# Patient Record
Sex: MALE | Race: BLACK OR AFRICAN AMERICAN | URBAN - METROPOLITAN AREA
[De-identification: names, ages, dates, MRNs, and addresses within clinical notes are randomized per-mention and may not be internally consistent; named-entity substitution may affect disease eponyms.]

---

## 2019-12-09 ENCOUNTER — OFFICE VISIT (OUTPATIENT)
Dept: FAMILY MEDICINE CLINIC | Age: 29
End: 2019-12-09

## 2019-12-09 ENCOUNTER — HOSPITAL ENCOUNTER (OUTPATIENT)
Dept: LAB | Age: 29
Discharge: HOME | End: 2019-12-09
Payer: MEDICAID

## 2019-12-09 VITALS
BODY MASS INDEX: 50.62 KG/M2 | TEMPERATURE: 98.3 F | HEART RATE: 82 BPM | RESPIRATION RATE: 22 BRPM | DIASTOLIC BLOOD PRESSURE: 64 MMHG | WEIGHT: 315 LBS | OXYGEN SATURATION: 97 % | HEIGHT: 66 IN | SYSTOLIC BLOOD PRESSURE: 125 MMHG

## 2019-12-09 DIAGNOSIS — Z00.00 ANNUAL PHYSICAL EXAM: ICD-10-CM

## 2019-12-09 DIAGNOSIS — H18.9 CORNEA DISORDER: ICD-10-CM

## 2019-12-09 DIAGNOSIS — H53.8 BLURRED VISION, BILATERAL: ICD-10-CM

## 2019-12-09 DIAGNOSIS — E66.01 OBESITY, MORBID (HCC): ICD-10-CM

## 2019-12-09 DIAGNOSIS — Z00.00 ANNUAL PHYSICAL EXAM: Primary | ICD-10-CM

## 2019-12-09 LAB
ALBUMIN SERPL-MCNC: 3.3 G/DL (ref 3.4–5)
ALBUMIN/GLOB SERPL: 0.8 {RATIO} (ref 0.8–1.7)
ALP SERPL-CCNC: 87 U/L (ref 45–117)
ALT SERPL-CCNC: 39 U/L (ref 16–61)
ANION GAP SERPL CALC-SCNC: 3 MMOL/L (ref 3–18)
AST SERPL-CCNC: 18 U/L (ref 10–38)
BASOPHILS # BLD: 0 K/UL (ref 0–0.1)
BASOPHILS NFR BLD: 0 % (ref 0–2)
BILIRUB SERPL-MCNC: 0.3 MG/DL (ref 0.2–1)
BUN SERPL-MCNC: 9 MG/DL (ref 7–18)
BUN/CREAT SERPL: 13 (ref 12–20)
CALCIUM SERPL-MCNC: 9.4 MG/DL (ref 8.5–10.1)
CHLORIDE SERPL-SCNC: 107 MMOL/L (ref 100–111)
CO2 SERPL-SCNC: 28 MMOL/L (ref 21–32)
CREAT SERPL-MCNC: 0.71 MG/DL (ref 0.6–1.3)
DIFFERENTIAL METHOD BLD: ABNORMAL
EOSINOPHIL # BLD: 0.1 K/UL (ref 0–0.4)
EOSINOPHIL NFR BLD: 1 % (ref 0–5)
ERYTHROCYTE [DISTWIDTH] IN BLOOD BY AUTOMATED COUNT: 17.4 % (ref 11.6–14.5)
GLOBULIN SER CALC-MCNC: 4.4 G/DL (ref 2–4)
GLUCOSE SERPL-MCNC: 90 MG/DL (ref 74–99)
HCT VFR BLD AUTO: 41.9 % (ref 36–48)
HGB BLD-MCNC: 12.8 G/DL (ref 13–16)
LYMPHOCYTES # BLD: 1.8 K/UL (ref 0.9–3.6)
LYMPHOCYTES NFR BLD: 20 % (ref 21–52)
MCH RBC QN AUTO: 24.7 PG (ref 24–34)
MCHC RBC AUTO-ENTMCNC: 30.5 G/DL (ref 31–37)
MCV RBC AUTO: 80.9 FL (ref 74–97)
MONOCYTES # BLD: 0.5 K/UL (ref 0.05–1.2)
MONOCYTES NFR BLD: 5 % (ref 3–10)
NEUTS SEG # BLD: 6.7 K/UL (ref 1.8–8)
NEUTS SEG NFR BLD: 74 % (ref 40–73)
PLATELET # BLD AUTO: 309 K/UL (ref 135–420)
PMV BLD AUTO: 11.3 FL (ref 9.2–11.8)
POTASSIUM SERPL-SCNC: 4.3 MMOL/L (ref 3.5–5.5)
PROT SERPL-MCNC: 7.7 G/DL (ref 6.4–8.2)
RBC # BLD AUTO: 5.18 M/UL (ref 4.7–5.5)
SODIUM SERPL-SCNC: 138 MMOL/L (ref 136–145)
WBC # BLD AUTO: 9.1 K/UL (ref 4.6–13.2)

## 2019-12-09 PROCEDURE — 36415 COLL VENOUS BLD VENIPUNCTURE: CPT

## 2019-12-09 PROCEDURE — 80053 COMPREHEN METABOLIC PANEL: CPT

## 2019-12-09 PROCEDURE — 80061 LIPID PANEL: CPT

## 2019-12-09 PROCEDURE — 85025 COMPLETE CBC W/AUTO DIFF WBC: CPT

## 2019-12-09 NOTE — PROGRESS NOTES
Bree Henao     Chief Complaint   Patient presents with   [facility].    Complete Physical     Vitals:    12/09/19 1253   BP: 125/64   Pulse: 82   Resp: 22   Temp: 98.3 °F (36.8 °C)   TempSrc: Oral   SpO2: 97%   Weight: (!) 416 lb (188.7 kg)   Height: 5' 6\" (1.676 m)   PainSc:   0 - No pain         HPI:Mr. Mary Gauthier is here to get annual physical examination patient stated that that  he had this appointment was told by his mother, he is requesting referral he had a problem with his cornea on the left eye, now he is experiencing similar symptoms on the right eye and decreased vision, and has not seen an eye doctor in a while. Patient is morbidly obese with BMI of 67 he said when he graduated high school his weight was, 270, he used to play football when he sustained an injury to his right knee,, and after that he stopped playing and he started increasing weight, patient has not worked for about 10 years he stayed home with his grandmother,. He is going to gym now about twice a week. Spent 1 hour each time. And he stated he had his diet most probably not the best!! History reviewed. No pertinent past medical history. Past Surgical History:   Procedure Laterality Date    HX APPENDECTOMY       Social History     Tobacco Use    Smoking status: Never Smoker    Smokeless tobacco: Never Used   Substance Use Topics    Alcohol use: Yes     Comment: very seldom, rare occasion       Family History   Problem Relation Age of Onset    No Known Problems Mother     No Known Problems Father        Review of Systems   Constitutional: Negative for chills, fever, malaise/fatigue and weight loss. HENT: Negative for congestion, ear discharge, ear pain, hearing loss, nosebleeds, sinus pain and sore throat. Eyes: Negative for blurred vision, double vision and discharge. Respiratory: Positive for shortness of breath. Negative for cough, hemoptysis, sputum production and wheezing.          With moderate exertion    Cardiovascular: Negative for chest pain, palpitations, claudication and leg swelling. Gastrointestinal: Negative for abdominal pain, blood in stool, constipation, diarrhea, melena, nausea and vomiting. Genitourinary: Negative for dysuria, flank pain, frequency, hematuria and urgency. Musculoskeletal: Positive for back pain and joint pain. Negative for falls, myalgias and neck pain. Skin: Negative for itching and rash. Neurological: Negative for dizziness, tingling, sensory change, speech change, focal weakness, seizures, loss of consciousness, weakness and headaches. Psychiatric/Behavioral: Negative for depression, hallucinations, memory loss, substance abuse and suicidal ideas. The patient is not nervous/anxious and does not have insomnia. Physical Exam  Vitals signs and nursing note reviewed. Constitutional:       General: He is not in acute distress. Appearance: He is well-developed. He is not diaphoretic. HENT:      Head: Normocephalic and atraumatic. Mouth/Throat:      Pharynx: No oropharyngeal exudate. Eyes:      General: No scleral icterus. Right eye: No discharge. Left eye: No discharge. Conjunctiva/sclera: Conjunctivae normal.      Pupils: Pupils are equal, round, and reactive to light. Comments: There is thickening ? ?scar tissues in the left Cornea     Neck:      Musculoskeletal: Normal range of motion and neck supple. Thyroid: No thyromegaly. Cardiovascular:      Rate and Rhythm: Normal rate and regular rhythm. Heart sounds: Normal heart sounds. Pulmonary:      Effort: Pulmonary effort is normal. No respiratory distress. Breath sounds: Normal breath sounds. No rales. Abdominal:      General: Bowel sounds are normal. There is no distension. Palpations: Abdomen is soft. There is no mass. Tenderness: There is no tenderness. There is no rebound. Musculoskeletal: Normal range of motion. General: No tenderness or deformity. Lymphadenopathy:      Cervical: No cervical adenopathy. Skin:     General: Skin is warm and dry. Findings: No erythema or rash. Neurological:      Mental Status: He is alert and oriented to person, place, and time. Cranial Nerves: No cranial nerve deficit. Coordination: Coordination normal.   Psychiatric:         Thought Content: Thought content normal.         Judgment: Judgment normal.          Assessment and plan     Plan of care has been discussed with the patient, he agrees to the plan and verbalized understanding. All his questions were answered  More than 50% of the time spent in this visit was counseling the patient about  illness and treatment options         1. Obesity, morbid ([facility]      I have discussed with patient his options as far as weight loss either     dietitian referral     weight loss management    Or  bariatric surgery    Patient choose to be referred to a dietitian        - REFERRAL TO PHYSICAL THERAPY    2. Annual physical exam    - CBC WITH AUTOMATED DIFF; Future  - METABOLIC PANEL, COMPREHENSIVE; Future  - LIPID PANEL; Future    3. Blurred vision, bilateral      - REFERRAL TO OPHTHALMOLOGY    4. Cornea disorder    - REFERRAL TO OPHTHALMOLOGY        Patient Active Problem List    Diagnosis Date Noted    Obesity, morbid ([facility] 12/09/2019     No results found for this or any previous visit. No results found for any previous visit. Follow-up and Dispositions    · Return for as per results.

## 2019-12-09 NOTE — PROGRESS NOTES
Nehemias Stone presents today for   Chief Complaint   Patient presents with   [facility] Establish Care    Complete Physical       Is someone accompanying this pt? no    Is the patient using any DME equipment during OV? no    Depression Screening:  3 most recent PHQ Screens 12/9/2019   Little interest or pleasure in doing things Not at all   Feeling down, depressed, irritable, or hopeless Not at all   Total Score PHQ 2 0       Learning Assessment:  Learning Assessment 12/9/2019   PRIMARY LEARNER Patient   PRIMARY LANGUAGE ENGLISH   LEARNER PREFERENCE PRIMARY DEMONSTRATION     READING     VIDEOS     LISTENING     PICTURES   ANSWERED BY patient   RELATIONSHIP SELF       Health Maintenance reviewed and discussed and ordered per Provider. Health Maintenance Due   Topic Date Due    DTaP/Tdap/Td series (1 - Tdap) 03/16/2002    PAP AKA CERVICAL CYTOLOGY  03/16/2012    Influenza Age 9 to Adult  08/01/2019   . Patient declines all vaccines. Patient is a male. Will have this switched to correctly reflect. Pt currently taking Antiplatelet therapy? no    Coordination of Care:  1. Have you been to the ER, urgent care clinic since your last visit? Hospitalized since your last visit? no    2. Have you seen or consulted any other health care providers outside of the [address] [place] [address] since your last visit? Include any pap smears or colon screening.  no

## 2019-12-10 LAB
CHOLEST SERPL-MCNC: 142 MG/DL
HDLC SERPL-MCNC: 49 MG/DL (ref 40–60)
HDLC SERPL: 2.9 {RATIO} (ref 0–5)
LDLC SERPL CALC-MCNC: 76.6 MG/DL (ref 0–100)
LIPID PROFILE,FLP: NORMAL
TRIGL SERPL-MCNC: 82 MG/DL (ref ?–150)
VLDLC SERPL CALC-MCNC: 16.4 MG/DL

## 2019-12-13 ENCOUNTER — TELEPHONE (OUTPATIENT)
Dept: FAMILY MEDICINE CLINIC | Age: 29
End: 2019-12-13

## 2019-12-13 NOTE — TELEPHONE ENCOUNTER
----- Message from Alejandro Truong MD sent at 12/13/2019 12:49 PM EST -----  Results shows iron deficiency anemia    Need to follow-up for further evaluation    Otherwise no remarkable abnormality

## 2019-12-13 NOTE — PROGRESS NOTES
Results shows iron deficiency anemia    Need to follow-up for further evaluation    Otherwise no remarkable abnormality

## 2019-12-13 NOTE — TELEPHONE ENCOUNTER
Patient contacted with results per PCP, verified 2 patient identifiers. He has no further questions or concerns at this time. He states he will call back to schedule follow up.

## 2020-12-03 ENCOUNTER — OFFICE VISIT (OUTPATIENT)
Dept: FAMILY MEDICINE CLINIC | Age: 30
End: 2020-12-03

## 2020-12-03 VITALS
WEIGHT: 315 LBS | BODY MASS INDEX: 50.62 KG/M2 | TEMPERATURE: 97.1 F | RESPIRATION RATE: 18 BRPM | DIASTOLIC BLOOD PRESSURE: 72 MMHG | SYSTOLIC BLOOD PRESSURE: 113 MMHG | HEIGHT: 66 IN | HEART RATE: 89 BPM | OXYGEN SATURATION: 97 %

## 2020-12-03 NOTE — PROGRESS NOTES
Junior Choudhury is a 35 y.o. male (: 1990) presenting to address:    Chief Complaint   Patient presents with    Complete Physical       Vitals:    20 0917   BP: 113/72   Pulse: 89   Resp: 18   Temp: 97.1 °F (36.2 °C)   TempSrc: Temporal   SpO2: 97%   Weight: (!) 410 lb (186 kg)   Height: 5' 6\" (1.676 m)   PainSc:   0 - No pain       Hearing/Vision:   No exam data present    Learning Assessment:     Learning Assessment 2019   PRIMARY LEARNER Patient   PRIMARY LANGUAGE ENGLISH   LEARNER PREFERENCE PRIMARY DEMONSTRATION     READING     VIDEOS     LISTENING     PICTURES   ANSWERED BY patient   RELATIONSHIP SELF     Depression Screening:     3 most recent PHQ Screens 12/3/2020   Little interest or pleasure in doing things Not at all   Feeling down, depressed, irritable, or hopeless Not at all   Total Score PHQ 2 0     Fall Risk Assessment:     Fall Risk Assessment, last 12 mths 12/3/2020   Able to walk? Yes   Fall in past 12 months? No     Abuse Screening:     Abuse Screening Questionnaire 12/3/2020   Do you ever feel afraid of your partner? N   Are you in a relationship with someone who physically or mentally threatens you? N   Is it safe for you to go home? Y     Coordination of Care Questionaire:   1. Have you been to the ER, urgent care clinic since your last visit? Hospitalized since your last visit? NO    2. Have you seen or consulted any other health care providers outside of the [address] [place] [address] since your last visit? Include any pap smears or colon screening. NO    Advanced Directive:   1. Do you have an Advanced Directive? NO    2. Would you like information on Advanced Directives? NO    Patient DECLINED flu vaccine.

## 2021-02-18 ENCOUNTER — HOSPITAL ENCOUNTER (OUTPATIENT)
Dept: LAB | Age: 31
Discharge: HOME | End: 2021-02-18
Payer: MEDICAID

## 2021-02-18 ENCOUNTER — APPOINTMENT (OUTPATIENT)
Dept: FAMILY MEDICINE CLINIC | Age: 31
End: 2021-02-18

## 2021-02-18 ENCOUNTER — OFFICE VISIT (OUTPATIENT)
Dept: FAMILY MEDICINE CLINIC | Age: 31
End: 2021-02-18
Payer: COMMERCIAL

## 2021-02-18 ENCOUNTER — TELEPHONE (OUTPATIENT)
Dept: FAMILY MEDICINE CLINIC | Age: 31
End: 2021-02-18

## 2021-02-18 VITALS
WEIGHT: 315 LBS | DIASTOLIC BLOOD PRESSURE: 84 MMHG | TEMPERATURE: 97 F | OXYGEN SATURATION: 100 % | RESPIRATION RATE: 17 BRPM | SYSTOLIC BLOOD PRESSURE: 130 MMHG | HEART RATE: 92 BPM | HEIGHT: 66 IN | BODY MASS INDEX: 50.62 KG/M2

## 2021-02-18 DIAGNOSIS — D50.9 IRON DEFICIENCY ANEMIA, UNSPECIFIED IRON DEFICIENCY ANEMIA TYPE: ICD-10-CM

## 2021-02-18 DIAGNOSIS — B35.9 TINEA: ICD-10-CM

## 2021-02-18 DIAGNOSIS — H18.9 CORNEA DISORDER: ICD-10-CM

## 2021-02-18 DIAGNOSIS — Z00.00 ANNUAL PHYSICAL EXAM: ICD-10-CM

## 2021-02-18 DIAGNOSIS — Z00.00 ANNUAL PHYSICAL EXAM: Primary | ICD-10-CM

## 2021-02-18 DIAGNOSIS — E66.01 OBESITY, MORBID (HCC): ICD-10-CM

## 2021-02-18 DIAGNOSIS — H18.603 KERATOCONUS OF BOTH EYES: ICD-10-CM

## 2021-02-18 LAB
ALBUMIN SERPL-MCNC: 3.3 G/DL (ref 3.4–5)
ALBUMIN/GLOB SERPL: 0.8 {RATIO} (ref 0.8–1.7)
ALP SERPL-CCNC: 87 U/L (ref 45–117)
ALT SERPL-CCNC: 33 U/L (ref 16–61)
ANION GAP SERPL CALC-SCNC: 7 MMOL/L (ref 3–18)
AST SERPL-CCNC: 17 U/L (ref 10–38)
BASOPHILS # BLD: 0 K/UL (ref 0–0.1)
BASOPHILS NFR BLD: 0 % (ref 0–2)
BILIRUB SERPL-MCNC: 0.3 MG/DL (ref 0.2–1)
BUN SERPL-MCNC: 8 MG/DL (ref 7–18)
BUN/CREAT SERPL: 13 (ref 12–20)
CALCIUM SERPL-MCNC: 9 MG/DL (ref 8.5–10.1)
CHLORIDE SERPL-SCNC: 106 MMOL/L (ref 100–111)
CHOLEST SERPL-MCNC: 150 MG/DL
CO2 SERPL-SCNC: 26 MMOL/L (ref 21–32)
CREAT SERPL-MCNC: 0.6 MG/DL (ref 0.6–1.3)
DIFFERENTIAL METHOD BLD: ABNORMAL
EOSINOPHIL # BLD: 0.1 K/UL (ref 0–0.4)
EOSINOPHIL NFR BLD: 1 % (ref 0–5)
ERYTHROCYTE [DISTWIDTH] IN BLOOD BY AUTOMATED COUNT: 16.9 % (ref 11.6–14.5)
GLOBULIN SER CALC-MCNC: 4.3 G/DL (ref 2–4)
GLUCOSE SERPL-MCNC: 84 MG/DL (ref 74–99)
HBA1C MFR BLD: 5.5 % (ref 4.2–5.6)
HCT VFR BLD AUTO: 42 % (ref 36–48)
HDLC SERPL-MCNC: 52 MG/DL (ref 40–60)
HDLC SERPL: 2.9 {RATIO} (ref 0–5)
HGB BLD-MCNC: 13.5 G/DL (ref 13–16)
IRON SATN MFR SERPL: 13 % (ref 20–50)
IRON SERPL-MCNC: 37 UG/DL (ref 50–175)
LDLC SERPL CALC-MCNC: 82 MG/DL (ref 0–100)
LIPID PROFILE,FLP: NORMAL
LYMPHOCYTES # BLD: 1.8 K/UL (ref 0.9–3.6)
LYMPHOCYTES NFR BLD: 15 % (ref 21–52)
MCH RBC QN AUTO: 25.3 PG (ref 24–34)
MCHC RBC AUTO-ENTMCNC: 32.1 G/DL (ref 31–37)
MCV RBC AUTO: 78.8 FL (ref 74–97)
MONOCYTES # BLD: 0.7 K/UL (ref 0.05–1.2)
MONOCYTES NFR BLD: 6 % (ref 3–10)
NEUTS SEG # BLD: 9.8 K/UL (ref 1.8–8)
NEUTS SEG NFR BLD: 78 % (ref 40–73)
PLATELET # BLD AUTO: 350 K/UL (ref 135–420)
PMV BLD AUTO: 11 FL (ref 9.2–11.8)
POTASSIUM SERPL-SCNC: 4.3 MMOL/L (ref 3.5–5.5)
PROT SERPL-MCNC: 7.6 G/DL (ref 6.4–8.2)
RBC # BLD AUTO: 5.33 M/UL (ref 4.7–5.5)
SODIUM SERPL-SCNC: 139 MMOL/L (ref 136–145)
TIBC SERPL-MCNC: 289 UG/DL (ref 250–450)
TRIGL SERPL-MCNC: 80 MG/DL (ref ?–150)
TSH SERPL DL<=0.05 MIU/L-ACNC: 1.09 UIU/ML (ref 0.36–3.74)
VLDLC SERPL CALC-MCNC: 16 MG/DL
WBC # BLD AUTO: 12.4 K/UL (ref 4.6–13.2)

## 2021-02-18 PROCEDURE — 80061 LIPID PANEL: CPT

## 2021-02-18 PROCEDURE — 85025 COMPLETE CBC W/AUTO DIFF WBC: CPT

## 2021-02-18 PROCEDURE — 83540 ASSAY OF IRON: CPT

## 2021-02-18 PROCEDURE — 36415 COLL VENOUS BLD VENIPUNCTURE: CPT

## 2021-02-18 PROCEDURE — 83036 HEMOGLOBIN GLYCOSYLATED A1C: CPT

## 2021-02-18 PROCEDURE — 80053 COMPREHEN METABOLIC PANEL: CPT

## 2021-02-18 PROCEDURE — 99395 PREV VISIT EST AGE 18-39: CPT | Performed by: LEGAL MEDICINE

## 2021-02-18 PROCEDURE — 84443 ASSAY THYROID STIM HORMONE: CPT

## 2021-02-18 RX ORDER — KETOCONAZOLE 20 MG/G
CREAM TOPICAL
Qty: 15 G | Refills: 0 | Status: SHIPPED | OUTPATIENT
Start: 2021-02-18 | End: 2021-02-18 | Stop reason: SDUPTHER

## 2021-02-18 NOTE — TELEPHONE ENCOUNTER
Patient called stating that his prescription wasn't covered at [facility] and would like for it to be resent to  [facility] on [address]. Please advise.

## 2021-02-18 NOTE — PROGRESS NOTES
Ulises Thompson is a 35 y.o. male (: 1990) presenting to address:    Chief Complaint   Patient presents with    Complete Physical     declined flu shot       Vitals:    21 1010   BP: 130/84   Pulse: 92   Resp: 17   Temp: 97 °F (36.1 °C)   TempSrc: Temporal   SpO2: 100%   Weight: (!) 398 lb (180.5 kg)   Height: 5' 6\" (1.676 m)   PainSc:   0 - No pain       Hearing/Vision:   No exam data present    Learning Assessment:     Learning Assessment 2019   PRIMARY LEARNER Patient   PRIMARY LANGUAGE ENGLISH   LEARNER PREFERENCE PRIMARY DEMONSTRATION     READING     VIDEOS     LISTENING     PICTURES   ANSWERED BY patient   RELATIONSHIP SELF     Depression Screening:     3 most recent PHQ Screens 2021   Little interest or pleasure in doing things Not at all   Feeling down, depressed, irritable, or hopeless Not at all   Total Score PHQ 2 0     Fall Risk Assessment:     Fall Risk Assessment, last 12 mths 2021   Able to walk? Yes   Fall in past 12 months? 0   Do you feel unsteady? 0   Are you worried about falling 0     Abuse Screening:     Abuse Screening Questionnaire 2021   Do you ever feel afraid of your partner? N   Are you in a relationship with someone who physically or mentally threatens you? N   Is it safe for you to go home? Y     Coordination of Care Questionaire:   1. Have you been to the ER, urgent care clinic since your last visit? Hospitalized since your last visit? NO    2. Have you seen or consulted any other health care providers outside of the [address] [place] [address] since your last visit? Include any pap smears or colon screening. NO    Advanced Directive:   1. Do you have an Advanced Directive? NO    2. Would you like information on Advanced Directives? NO    Patient DECLINED flu vaccine.

## 2021-02-18 NOTE — PROGRESS NOTES
Salty Mcarthur     Chief Complaint   Patient presents with    Complete Physical     declined flu shot     Vitals:    02/18/21 1010   BP: 130/84   Pulse: 92   Resp: 17   Temp: 97 °F (36.1 °C)   TempSrc: Temporal   SpO2: 100%   Weight: (!) 398 lb (180.5 kg)   Height: 5' 6\" (1.676 m)   PainSc:   0 - No pain         HPI:He is here for annual physical     He is morbidly obese I had lost 12 pounds in the last 2months he has been eating less and trying to eat healthy and regular are more    He has keratoconus following  Up at [facility]     He is not working currently last job he had in 2011 , graduated high school but did not finish college    He is staying home with his grandmother he is helping around the house    Does not smoke , drinks alcohol only few time a year   No illegal drug drugs , he smokes CBD vapors           No past medical history on file. Past Surgical History:   Procedure Laterality Date    HX APPENDECTOMY       Social History     Tobacco Use    Smoking status: Never Smoker    Smokeless tobacco: Never Used   Substance Use Topics    Alcohol use: Yes     Comment: very seldom, rare occasion       Family History   Problem Relation Age of Onset    No Known Problems Mother     No Known Problems Father        Review of Systems   Constitutional: Negative for chills, fever, malaise/fatigue and weight loss. HENT: Negative for congestion, ear discharge, ear pain, hearing loss, nosebleeds, sinus pain and sore throat. Eyes: Negative for blurred vision, double vision and discharge. Respiratory: Negative for cough, hemoptysis, sputum production, shortness of breath and wheezing. Cardiovascular: Negative for chest pain, palpitations, claudication and leg swelling. Gastrointestinal: Negative for abdominal pain, constipation, diarrhea, nausea and vomiting. Genitourinary: Negative for dysuria, flank pain, frequency, hematuria and urgency.    Musculoskeletal: Negative for back pain, falls, joint pain, myalgias and neck pain. Skin: Negative for itching and rash. Neurological: Negative for dizziness, tingling, sensory change, speech change, focal weakness, seizures, loss of consciousness, weakness and headaches. Psychiatric/Behavioral: Negative for depression, hallucinations, memory loss, substance abuse and suicidal ideas. The patient is not nervous/anxious and does not have insomnia. Physical Exam  Vitals signs and nursing note reviewed. Constitutional:       General: He is not in acute distress. Appearance: He is well-developed. He is not diaphoretic. HENT:      Head: Normocephalic and atraumatic. Eyes:      General: No scleral icterus. Right eye: No discharge. Left eye: No discharge. Conjunctiva/sclera: Conjunctivae normal.      Pupils: Pupils are equal, round, and reactive to light. Neck:      Musculoskeletal: Normal range of motion and neck supple. Thyroid: No thyromegaly. Cardiovascular:      Rate and Rhythm: Normal rate and regular rhythm. Heart sounds: Normal heart sounds. Pulmonary:      Effort: Pulmonary effort is normal. No respiratory distress. Breath sounds: Normal breath sounds. No rales. Abdominal:      General: Bowel sounds are normal. There is no distension. Palpations: Abdomen is soft. There is no mass. Tenderness: There is no abdominal tenderness. There is no rebound. Musculoskeletal: Normal range of motion. General: No tenderness or deformity. Lymphadenopathy:      Cervical: No cervical adenopathy. Skin:     General: Skin is warm and dry. Findings: No erythema or rash. Comments: There is a patch on anterior neck hyperpigmented well-demarcated not itching 3 cm in diameter smooth surface   Neurological:      Mental Status: He is alert and oriented to person, place, and time. Cranial Nerves: No cranial nerve deficit.       Coordination: Coordination normal.   Psychiatric:         Thought Content: Thought content normal.         Judgment: Judgment normal.          Assessment and plan     Plan of care has been discussed with the patient, he agrees to the plan and verbalized understanding. All his questions were answered  More than 50% of the time spent in this visit was counseling the patient about  illness and treatment options         1. Annual physical exam    - METABOLIC PANEL, COMPREHENSIVE; Future  - LIPID PANEL; Future  - CBC WITH AUTOMATED DIFF; Future  - TSH 3RD GENERATION; Future    2. Iron deficiency anemia, unspecified iron deficiency anemia type  He is not on iron supplements eating iron rich diet     - IRON PROFILE; Future    3. Obesity, morbid ([facility]   have discussed with him in length regarding lifestyle modifications      - HEMOGLOBIN A1C W/O EAG; Future  - TSH 3RD GENERATION; Future  - REFERRAL TO PHYSICAL THERAPY    4. Cornea disorder      5. Keratoconus of both eyes  Follow-up was ophthalmologist  6. Tinea  His medication for 3 to 4weeks if there is no improvement patient will be referred to dermatologist  - ketoconazole (NIZORAL) 2 % topical cream; Use daily for 4 weeks  Dispense: 15 g; Refill: 0        Patient Active Problem List    Diagnosis Date Noted    Obesity, morbid ([facility] 12/09/2019     Results for orders placed or performed during the hospital encounter of 12/09/19   CBC WITH AUTOMATED DIFF   Result Value Ref Range    WBC 9.1 4.6 - 13.2 K/uL    RBC 5.18 4.70 - 5.50 M/uL    HGB 12.8 (L) 13.0 - 16.0 g/dL    HCT 41.9 36.0 - 48.0 %    MCV 80.9 74.0 - 97.0 FL    MCH 24.7 24.0 - 34.0 PG    MCHC 30.5 (L) 31.0 - 37.0 g/dL    RDW 17.4 (H) 11.6 - 14.5 %    PLATELET 608 472 - 616 K/uL    MPV 11.3 9.2 - 11.8 FL    NEUTROPHILS 74 (H) 40 - 73 %    LYMPHOCYTES 20 (L) 21 - 52 %    MONOCYTES 5 3 - 10 %    EOSINOPHILS 1 0 - 5 %    BASOPHILS 0 0 - 2 %    ABS. NEUTROPHILS 6.7 1.8 - 8.0 K/UL    ABS. LYMPHOCYTES 1.8 0.9 - 3.6 K/UL    ABS. MONOCYTES 0.5 0.05 - 1.2 K/UL    ABS.  EOSINOPHILS 0.1 0.0 - 0.4 K/UL    ABS. BASOPHILS 0.0 0.0 - 0.1 K/UL    DF AUTOMATED     METABOLIC PANEL, COMPREHENSIVE   Result Value Ref Range    Sodium 138 136 - 145 mmol/L    Potassium 4.3 3.5 - 5.5 mmol/L    Chloride 107 100 - 111 mmol/L    CO2 28 21 - 32 mmol/L    Anion gap 3 3.0 - 18 mmol/L    Glucose 90 74 - 99 mg/dL    BUN 9 7.0 - 18 MG/DL    Creatinine 0.71 0.6 - 1.3 MG/DL    BUN/Creatinine ratio 13 12 - 20      GFR est AA >60 >60 ml/min/1.73m2    GFR est non-AA >60 >60 ml/min/1.73m2    Calcium 9.4 8.5 - 10.1 MG/DL    Bilirubin, total 0.3 0.2 - 1.0 MG/DL    ALT (SGPT) 39 16 - 61 U/L    AST (SGOT) 18 10 - 38 U/L    Alk. phosphatase 87 45 - 117 U/L    Protein, total 7.7 6.4 - 8.2 g/dL    Albumin 3.3 (L) 3.4 - 5.0 g/dL    Globulin 4.4 (H) 2.0 - 4.0 g/dL    A-G Ratio 0.8 0.8 - 1.7     LIPID PANEL   Result Value Ref Range    LIPID PROFILE          Cholesterol, total 142 <200 MG/DL    Triglyceride 82 <150 MG/DL    HDL Cholesterol 49 40 - 60 MG/DL    LDL, calculated 76.6 0 - 100 MG/DL    VLDL, calculated 16.4 MG/DL    CHOL/HDL Ratio 2.9 0 - 5.0       No visits with results within 3 Month(s) from this visit. Latest known visit with results is:   Hospital Outpatient Visit on 12/09/2019   Component Date Value Ref Range Status    WBC 12/09/2019 9.1  4.6 - 13.2 K/uL Final    RBC 12/09/2019 5.18  4.70 - 5.50 M/uL Final    HGB 12/09/2019 12.8* 13.0 - 16.0 g/dL Final    HCT 12/09/2019 41.9  36.0 - 48.0 % Final    MCV 12/09/2019 80.9  74.0 - 97.0 FL Final    MCH 12/09/2019 24.7  24.0 - 34.0 PG Final    MCHC 12/09/2019 30.5* 31.0 - 37.0 g/dL Final    RDW 12/09/2019 17.4* 11.6 - 14.5 % Final    PLATELET 42/69/2034 626  135 - 420 K/uL Final    MPV 12/09/2019 11.3  9.2 - 11.8 FL Final    NEUTROPHILS 12/09/2019 74* 40 - 73 % Final    LYMPHOCYTES 12/09/2019 20* 21 - 52 % Final    MONOCYTES 12/09/2019 5  3 - 10 % Final    EOSINOPHILS 12/09/2019 1  0 - 5 % Final    BASOPHILS 12/09/2019 0  0 - 2 % Final    ABS.  NEUTROPHILS 12/09/2019 6.7  1.8 - 8.0 K/UL Final    ABS. LYMPHOCYTES 12/09/2019 1.8  0.9 - 3.6 K/UL Final    ABS. MONOCYTES 12/09/2019 0.5  0.05 - 1.2 K/UL Final    ABS. EOSINOPHILS 12/09/2019 0.1  0.0 - 0.4 K/UL Final    ABS. BASOPHILS 12/09/2019 0.0  0.0 - 0.1 K/UL Final    DF 12/09/2019 AUTOMATED    Final    Sodium 12/09/2019 138  136 - 145 mmol/L Final    Potassium 12/09/2019 4.3  3.5 - 5.5 mmol/L Final    Chloride 12/09/2019 107  100 - 111 mmol/L Final    CO2 12/09/2019 28  21 - 32 mmol/L Final    Anion gap 12/09/2019 3  3.0 - 18 mmol/L Final    Glucose 12/09/2019 90  74 - 99 mg/dL Final    BUN 12/09/2019 9  7.0 - 18 MG/DL Final    Creatinine 12/09/2019 0.71  0.6 - 1.3 MG/DL Final    BUN/Creatinine ratio 12/09/2019 13  12 - 20   Final    GFR est AA 12/09/2019 >60  >60 ml/min/1.73m2 Final    GFR est non-AA 12/09/2019 >60  >60 ml/min/1.73m2 Final    Comment: (NOTE)  Estimated GFR is calculated using the Modification of Diet in Renal   Disease (MDRD) Study equation, reported for both  Americans   (GFRAA) and non- Americans (GFRNA), and normalized to 1.73m2   body surface area. The physician must decide which value applies to   the patient. The MDRD study equation should only be used in   individuals age 25 or older. It has not been validated for the   following: pregnant women, patients with serious comorbid conditions,   or on certain medications, or persons with extremes of body size,   muscle mass, or nutritional status.  Calcium 12/09/2019 9.4  8.5 - 10.1 MG/DL Final    Bilirubin, total 12/09/2019 0.3  0.2 - 1.0 MG/DL Final    ALT (SGPT) 12/09/2019 39  16 - 61 U/L Final    AST (SGOT) 12/09/2019 18  10 - 38 U/L Final    Alk.  phosphatase 12/09/2019 87  45 - 117 U/L Final    Protein, total 12/09/2019 7.7  6.4 - 8.2 g/dL Final    Albumin 12/09/2019 3.3* 3.4 - 5.0 g/dL Final    Globulin 12/09/2019 4.4* 2.0 - 4.0 g/dL Final    A-G Ratio 12/09/2019 0.8  0.8 - 1.7   Final    LIPID PROFILE 12/09/2019        Final    Cholesterol, total 12/09/2019 142  <200 MG/DL Final    Triglyceride 12/09/2019 82  <150 MG/DL Final    Comment: The drugs N-acetylcysteine (NAC) and  Metamiszole have been found to cause falsely  low results in this chemical assay. Please  be sure to submit blood samples obtained  BEFORE administration of either of these  drugs to assure correct results.  HDL Cholesterol 12/09/2019 49  40 - 60 MG/DL Final    LDL, calculated 12/09/2019 76.6  0 - 100 MG/DL Final    VLDL, calculated 12/09/2019 16.4  MG/DL Final    CHOL/HDL Ratio 12/09/2019 2.9  0 - 5.0   Final          Follow-up and Dispositions    · Return in about 6 months (around 8/18/2021).

## 2021-02-19 RX ORDER — KETOCONAZOLE 20 MG/G
CREAM TOPICAL
Qty: 15 G | Refills: 1 | Status: SHIPPED | OUTPATIENT
Start: 2021-02-19 | End: 2022-07-20

## 2021-03-31 ENCOUNTER — HOSPITAL ENCOUNTER (OUTPATIENT)
Dept: NUTRITION | Age: 31
Discharge: HOME | End: 2021-03-31
Payer: MEDICAID

## 2021-03-31 PROCEDURE — 97802 MEDICAL NUTRITION INDIV IN: CPT

## 2021-03-31 NOTE — PROGRESS NOTES
..  [address] [place] [address]   Nutrition Assessment  Medical Nutrition Therapy   Outpatient Initial Evaluation         Patient Name: Leann Enriquez : 1990   Treatment Diagnosis: Morbid obesity   Referral Source: Dung Hinojosa MD Start of Care [facility]): 3/31/2021     Gender: male Age: 28 y.o. Ht: 66 in Wt: 398  lb  kg   BMI: 64.24 BMR   Male 2708 BMR Female      Past Medical History:  n/a     Pertinent Medications:   n/a     Biochemical Data:   Lab Results   Component Value Date/Time    Hemoglobin A1c 5.5 2021 10:48 AM     Lab Results   Component Value Date/Time    Sodium 139 2021 10:48 AM    Potassium 4.3 2021 10:48 AM    Chloride 106 2021 10:48 AM    CO2 26 2021 10:48 AM    Anion gap 7 2021 10:48 AM    Glucose 84 2021 10:48 AM    BUN 8 2021 10:48 AM    Creatinine 0.60 2021 10:48 AM    BUN/Creatinine ratio 13 2021 10:48 AM    GFR est AA >60 2021 10:48 AM    GFR est non-AA >60 2021 10:48 AM    Calcium 9.0 2021 10:48 AM    Bilirubin, total 0.3 2021 10:48 AM    Alk. phosphatase 87 2021 10:48 AM    Protein, total 7.6 2021 10:48 AM    Albumin 3.3 (L) 2021 10:48 AM    Globulin 4.3 (H) 2021 10:48 AM    A-G Ratio 0.8 2021 10:48 AM    ALT (SGPT) 33 2021 10:48 AM    AST (SGOT) 17 2021 10:48 AM     Lab Results   Component Value Date/Time    Cholesterol, total 150 2021 10:48 AM    HDL Cholesterol 52 2021 10:48 AM    LDL, calculated 82 2021 10:48 AM    VLDL, calculated 16 2021 10:48 AM    Triglyceride 80 2021 10:48 AM    CHOL/HDL Ratio 2.9 2021 10:48 AM     Lab Results   Component Value Date/Time    ALT (SGPT) 33 2021 10:48 AM    AST (SGOT) 17 2021 10:48 AM    Alk.  phosphatase 87 2021 10:48 AM    Bilirubin, total 0.3 2021 10:48 AM     Lab Results Component Value Date/Time    Creatinine 0.60 02/18/2021 10:48 AM     Lab Results   Component Value Date/Time    BUN 8 02/18/2021 10:48 AM     No results found for: MCACR, MCA1, MCA2, MCA3, MCAU, MCAU2, MCALPOCT     Assessment:   The patient is here today because his doctor told him he needs to lose weight. The patient is unemployed and lives with his grandmother. When asked what he does during the day, the patient said, \"I sleep. \" The patient has a sleep disorder. He says he stays up all night and sleeps during the day. He says lately he has been trying to get up before noon but he doesn't eat anything until the evening when his grandmother prepares him food. He says he eats whenever he is hungry. He does not exercise at all. Food & Nutrition: The patient did not provide RD with a complete diet history. He says he eats a lot of [place] food (The patient is  and these are the foods his grandmother will cook). 7-8 PM meal: plate of rice and samaniego beans, chicken, sometimes spinach or bag of salad   Beverages: drinks 6 Gallons of 2% milk a week, \"sometimes\" will drink Sprite, Gingerale, Mary, water (2-3 bottles in 24 hours)    Problems: sleep disorder, no schedule for sleeping and eating, eats when hungry, huge portions but did not tell me what he eats, drinks sugary drinks and 2% milk all day, no exercise        Estimate Needs   Calories: 2200 to lose 1# per week  Protein: 138 Carbs: 248 Fat: 73   Kcal/day  g/day  g/day  g/day        percent: 25  45  30               Nutrition Diagnosis Morbid obesity related to excessive energy intake and no exercise as evidenced by BMI of 64.24         Nutrition Intervention &  Education: Educated patient on weight loss diet with plate method guidelines. Get on a schedule with sleeping and eating. Encouraged the patient to eat at least 3 times per day, spacing meals no more than 4-5 hours apart.  Discussed that 1/2 the plate should include non-starchy vegetables, 1/4 plate should be lean protein, and 1/4 of plate should be carbohydrate. Provided the patient with list of macro-nutrient sources (CHO, pro, and fat) and appropriate amounts of CHO to be consumed at each meal and snack. Suggested the patient include lean protein source with all meals and snacks. Include more non-starchy vegetables and 2 fruit servings per day (eat a variety). Don't drink calories: avoid fruit juice, regular sodas, sweet tea, Gatorade, milk. Eliminate/decrease fast food consumption. We discussed the importance of timing of meals. Discussed importance of 150 minutes per week physical activity. Handouts Provided: [x]  Carbohydrates  [x]  Protein  [x]  Non-starchy Vegetbles  []  Food Label  [x]  Meal and Snack Ideas  []  Food Journals []  Diabetes  []  Cholesterol  []  Sodium  [x]  Gen Nutr Guidelines  []  SBGM Guidelines  [x]  Others: My Healthy Plate   Information Reviewed with: Patient   Readiness to Change Stage: [x]  Pre-contemplative    []  Contemplative  []  Preparation               []  Action                  []  Maintenance   Potential Barriers to Learning: []  Decline in memory    []  Language barrier   []  Other:  [x]  Emotional                  []  Limited mobility  [x]  Lack of motivation     [] Vision, hearing or cognitive impairment   Expected Compliance: Poor. The patient is not ready to make the necessary lifestyle changes to see improvement. He needs a weight loss program with counseling, support group, and constant follow up. May need to consider bariatric surgery. (Patient needs to lose about 1/2 his body weight to be healthy.) Patient needs to manage his sleep to start.      Nutritional Goal - To promote lifestyle changes to result in:    [x]  Weight loss  []  Improved diabetic control  []  Decreased cholesterol levels  []  Decreased blood pressure  []  Weight maintenance []  Preventing any interactions associated with food allergies  []  Adequate weight gain toward goal weight  []  Other:        Patient Goals:   1. Get on a sleep schedule (go to bed at the same time every night). Then, eat on a schedule. Eat something within an hour of waking and then every 4-5 hours after that. 2. Follow plate method. The patient should not need snacks if eating  balanced meals. Eliminate all junk food and fast food. 3. Eliminate all sugary drinks. (Do not drink a gallon of milk every day; switch to skim milk). Switch to diet sodas. Drink 64 oz water daily. 4. Start some exercise. Try walking after meals for 10-15 minutes per session, twice a day, every day. Dietitian Signature: Agustin Albert RD,[facility] Date: 3/31/2021   Follow-up: PRN. Suggested patient call [facility] for program options.  (Patient is not ready at this time.) Time: 1:59 PM

## 2022-03-18 PROBLEM — E66.01 OBESITY, MORBID (HCC): Status: ACTIVE | Noted: 2019-12-09

## 2022-07-20 ENCOUNTER — HOSPITAL ENCOUNTER (OUTPATIENT)
Dept: LAB | Age: 32
Discharge: HOME | End: 2022-07-20
Payer: MEDICAID

## 2022-07-20 ENCOUNTER — OFFICE VISIT (OUTPATIENT)
Dept: FAMILY MEDICINE CLINIC | Age: 32
End: 2022-07-20
Payer: MEDICAID

## 2022-07-20 ENCOUNTER — APPOINTMENT (OUTPATIENT)
Dept: FAMILY MEDICINE CLINIC | Age: 32
End: 2022-07-20

## 2022-07-20 VITALS
HEIGHT: 66 IN | RESPIRATION RATE: 18 BRPM | HEART RATE: 97 BPM | SYSTOLIC BLOOD PRESSURE: 124 MMHG | WEIGHT: 315 LBS | TEMPERATURE: 98 F | DIASTOLIC BLOOD PRESSURE: 88 MMHG | OXYGEN SATURATION: 96 % | BODY MASS INDEX: 50.62 KG/M2

## 2022-07-20 DIAGNOSIS — E66.01 OBESITY, MORBID (HCC): ICD-10-CM

## 2022-07-20 DIAGNOSIS — Z00.00 ANNUAL PHYSICAL EXAM: ICD-10-CM

## 2022-07-20 DIAGNOSIS — Z20.2 POSSIBLE EXPOSURE TO STD: ICD-10-CM

## 2022-07-20 DIAGNOSIS — Z13.1 SCREENING FOR DIABETES MELLITUS (DM): ICD-10-CM

## 2022-07-20 DIAGNOSIS — H18.603 KERATOCONUS OF BOTH EYES: ICD-10-CM

## 2022-07-20 DIAGNOSIS — D50.9 IRON DEFICIENCY ANEMIA, UNSPECIFIED IRON DEFICIENCY ANEMIA TYPE: ICD-10-CM

## 2022-07-20 DIAGNOSIS — Z00.00 ANNUAL PHYSICAL EXAM: Primary | ICD-10-CM

## 2022-07-20 LAB
ALBUMIN SERPL-MCNC: 3.6 G/DL (ref 3.4–5)
ALBUMIN/GLOB SERPL: 0.9 {RATIO} (ref 0.8–1.7)
ALP SERPL-CCNC: 83 U/L (ref 45–117)
ALT SERPL-CCNC: 43 U/L (ref 16–61)
ANION GAP SERPL CALC-SCNC: 6 MMOL/L (ref 3–18)
AST SERPL-CCNC: 26 U/L (ref 10–38)
BASOPHILS # BLD: 0 K/UL (ref 0–0.1)
BASOPHILS NFR BLD: 0 % (ref 0–2)
BILIRUB SERPL-MCNC: 0.4 MG/DL (ref 0.2–1)
BUN SERPL-MCNC: 4 MG/DL (ref 7–18)
BUN/CREAT SERPL: 5 (ref 12–20)
CALCIUM SERPL-MCNC: 8.5 MG/DL (ref 8.5–10.1)
CHLORIDE SERPL-SCNC: 108 MMOL/L (ref 100–111)
CHOLEST SERPL-MCNC: 141 MG/DL
CO2 SERPL-SCNC: 26 MMOL/L (ref 21–32)
CREAT SERPL-MCNC: 0.8 MG/DL (ref 0.6–1.3)
DIFFERENTIAL METHOD BLD: ABNORMAL
EOSINOPHIL # BLD: 0 K/UL (ref 0–0.4)
EOSINOPHIL NFR BLD: 0 % (ref 0–5)
ERYTHROCYTE [DISTWIDTH] IN BLOOD BY AUTOMATED COUNT: 16.4 % (ref 11.6–14.5)
FERRITIN SERPL-MCNC: 111 NG/ML (ref 8–388)
GLOBULIN SER CALC-MCNC: 4 G/DL (ref 2–4)
GLUCOSE SERPL-MCNC: 84 MG/DL (ref 74–99)
HBA1C MFR BLD: 5.4 % (ref 4.2–5.6)
HCT VFR BLD AUTO: 46 % (ref 36–48)
HDLC SERPL-MCNC: 51 MG/DL (ref 40–60)
HDLC SERPL: 2.8 {RATIO} (ref 0–5)
HGB BLD-MCNC: 14.5 G/DL (ref 13–16)
IMM GRANULOCYTES # BLD AUTO: 0 K/UL (ref 0–0.04)
IMM GRANULOCYTES NFR BLD AUTO: 0 % (ref 0–0.5)
IRON SATN MFR SERPL: 18 % (ref 20–50)
IRON SERPL-MCNC: 56 UG/DL (ref 50–175)
LDLC SERPL CALC-MCNC: 72.4 MG/DL (ref 0–100)
LIPID PROFILE,FLP: NORMAL
LYMPHOCYTES # BLD: 1.5 K/UL (ref 0.9–3.6)
LYMPHOCYTES NFR BLD: 26 % (ref 21–52)
MCH RBC QN AUTO: 25.8 PG (ref 24–34)
MCHC RBC AUTO-ENTMCNC: 31.5 G/DL (ref 31–37)
MCV RBC AUTO: 82 FL (ref 78–100)
MONOCYTES # BLD: 0.6 K/UL (ref 0.05–1.2)
MONOCYTES NFR BLD: 11 % (ref 3–10)
NEUTS SEG # BLD: 3.4 K/UL (ref 1.8–8)
NEUTS SEG NFR BLD: 62 % (ref 40–73)
NRBC # BLD: 0 K/UL (ref 0–0.01)
NRBC BLD-RTO: 0 PER 100 WBC
PLATELET # BLD AUTO: 262 K/UL (ref 135–420)
PMV BLD AUTO: 11.5 FL (ref 9.2–11.8)
POTASSIUM SERPL-SCNC: 3.6 MMOL/L (ref 3.5–5.5)
PROT SERPL-MCNC: 7.6 G/DL (ref 6.4–8.2)
RBC # BLD AUTO: 5.61 M/UL (ref 4.35–5.65)
SODIUM SERPL-SCNC: 140 MMOL/L (ref 136–145)
T4 FREE SERPL-MCNC: 1.3 NG/DL (ref 0.7–1.5)
TIBC SERPL-MCNC: 314 UG/DL (ref 250–450)
TRIGL SERPL-MCNC: 88 MG/DL (ref ?–150)
TSH SERPL DL<=0.05 MIU/L-ACNC: 0.81 UIU/ML (ref 0.36–3.74)
VLDLC SERPL CALC-MCNC: 17.6 MG/DL
WBC # BLD AUTO: 5.5 K/UL (ref 4.6–13.2)

## 2022-07-20 PROCEDURE — 85025 COMPLETE CBC W/AUTO DIFF WBC: CPT

## 2022-07-20 PROCEDURE — 87491 CHLMYD TRACH DNA AMP PROBE: CPT

## 2022-07-20 PROCEDURE — 80053 COMPREHEN METABOLIC PANEL: CPT

## 2022-07-20 PROCEDURE — 80061 LIPID PANEL: CPT

## 2022-07-20 PROCEDURE — 87389 HIV-1 AG W/HIV-1&-2 AB AG IA: CPT

## 2022-07-20 PROCEDURE — 84439 ASSAY OF FREE THYROXINE: CPT

## 2022-07-20 PROCEDURE — 36415 COLL VENOUS BLD VENIPUNCTURE: CPT

## 2022-07-20 PROCEDURE — 86780 TREPONEMA PALLIDUM: CPT

## 2022-07-20 PROCEDURE — 99395 PREV VISIT EST AGE 18-39: CPT | Performed by: LEGAL MEDICINE

## 2022-07-20 PROCEDURE — 83036 HEMOGLOBIN GLYCOSYLATED A1C: CPT

## 2022-07-20 PROCEDURE — 83540 ASSAY OF IRON: CPT

## 2022-07-20 PROCEDURE — 87340 HEPATITIS B SURFACE AG IA: CPT

## 2022-07-20 PROCEDURE — 82728 ASSAY OF FERRITIN: CPT

## 2022-07-20 NOTE — PROGRESS NOTES
Farhana Cruz is a 33 y.o. male (: 1990) presenting to address:    Chief Complaint   Patient presents with    Physical       Vitals:    22 1455   BP: 124/88   Pulse: 97   Resp: 18   Temp: 98 °F (36.7 °C)   TempSrc: Temporal   SpO2: 96%   Weight: (!) 374 lb 12.8 oz (170 kg)   Height: 5' 6\" (1.676 m)   PainSc:   0 - No pain       Hearing/Vision:   No results found. Learning Assessment:     Learning Assessment 2019   PRIMARY LEARNER Patient   PRIMARY LANGUAGE ENGLISH   LEARNER PREFERENCE PRIMARY DEMONSTRATION     READING     VIDEOS     LISTENING     PICTURES   ANSWERED BY patient   RELATIONSHIP SELF     Depression Screening:     3 most recent PHQ Screens 2022   Little interest or pleasure in doing things Not at all   Feeling down, depressed, irritable, or hopeless Not at all   Total Score PHQ 2 0     Fall Risk Assessment:     Fall Risk Assessment, last 12 mths 2021   Able to walk? Yes   Fall in past 12 months? 0   Do you feel unsteady? 0   Are you worried about falling 0     Abuse Screening:     Abuse Screening Questionnaire 2021   Do you ever feel afraid of your partner? N   Are you in a relationship with someone who physically or mentally threatens you? N   Is it safe for you to go home? Y     ADL Assessment:   No flowsheet data found. Coordination of Care Questionaire:   1. \"Have you been to the ER, urgent care clinic since your last visit? Hospitalized since your last visit? \" No    2. \"Have you seen or consulted any other health care providers outside of the [address] [place] [address] since your last visit? \" No     3. For patients aged 39-70: Has the patient had a colonoscopy / FIT/ Cologuard? NA - based on age    If the patient is female:    4. For patients aged 41-77: Has the patient had a mammogram within the past 2 years? NA - based on age or sex  See top three    5. For patients aged 21-65: Has the patient had a pap smear?  NA - based on age or sex    Advanced Directive:   1. Do you have an Advanced Directive? NO    2. Would you like information on Advanced Directives?  NO

## 2022-07-20 NOTE — PROGRESS NOTES
Marko Matias     Chief Complaint   Patient presents with    Physical     Vitals:    07/20/22 1455   BP: 124/88   Pulse: 97   Resp: 18   Temp: 98 °F (36.7 °C)   TempSrc: Temporal   SpO2: 96%   Weight: (!) 374 lb 12.8 oz (170 kg)   Height: 5' 6\" (1.676 m)   PainSc:   0 - No pain         HPI:  is here for CPE   He lost 32 pounds since last year   He has been eating healthy and exercises on stationary bike 3 times per week      No past medical history on file. Past Surgical History:   Procedure Laterality Date    HX APPENDECTOMY       Social History     Tobacco Use    Smoking status: Never    Smokeless tobacco: Never   Substance Use Topics    Alcohol use: Yes     Comment: very seldom, rare occasion       Family History   Problem Relation Age of Onset    No Known Problems Mother     No Known Problems Father        Review of Systems   Constitutional:  Negative for chills, fever, malaise/fatigue and weight loss. HENT:  Negative for congestion, ear discharge, ear pain, hearing loss, nosebleeds, sinus pain, sore throat and tinnitus. Eyes:  Negative for blurred vision, double vision and discharge. Respiratory:  Negative for cough, hemoptysis, sputum production, shortness of breath, wheezing and stridor. Cardiovascular:  Negative for chest pain, palpitations, orthopnea, claudication, leg swelling and PND. Gastrointestinal:  Negative for abdominal pain, constipation, diarrhea, nausea and vomiting. Genitourinary:  Negative for dysuria, flank pain, frequency, hematuria and urgency. Musculoskeletal:  Positive for back pain and joint pain. Negative for falls, myalgias and neck pain. Skin:  Negative for itching and rash. Neurological:  Negative for dizziness, tingling, tremors, sensory change, speech change, focal weakness, seizures, loss of consciousness, weakness and headaches. Psychiatric/Behavioral:  Negative for depression, hallucinations, memory loss, substance abuse and suicidal ideas.  The patient is nervous/anxious. The patient does not have insomnia. Physical Exam  Constitutional:       General: He is not in acute distress. Appearance: Normal appearance. He is well-developed. He is not ill-appearing, toxic-appearing or diaphoretic. HENT:      Head: Normocephalic and atraumatic. Right Ear: Tympanic membrane, ear canal and external ear normal. There is no impacted cerumen. Left Ear: Tympanic membrane, ear canal and external ear normal. There is no impacted cerumen. Eyes:      General: No scleral icterus. Right eye: No discharge. Left eye: No discharge. Conjunctiva/sclera: Conjunctivae normal.      Pupils: Pupils are equal, round, and reactive to light. Neck:      Thyroid: No thyromegaly. Cardiovascular:      Rate and Rhythm: Normal rate and regular rhythm. Heart sounds: Normal heart sounds. Pulmonary:      Effort: Pulmonary effort is normal. No respiratory distress. Breath sounds: Normal breath sounds. No stridor. No wheezing, rhonchi or rales. Chest:      Chest wall: No tenderness. Abdominal:      General: Bowel sounds are normal. There is no distension. Palpations: Abdomen is soft. There is no mass. Tenderness: There is no abdominal tenderness. There is no right CVA tenderness, left CVA tenderness, guarding or rebound. Hernia: No hernia is present. Musculoskeletal:         General: No swelling, tenderness, deformity or signs of injury. Normal range of motion. Cervical back: Normal range of motion. No rigidity or tenderness. Right lower leg: No edema. Left lower leg: No edema. Lymphadenopathy:      Cervical: No cervical adenopathy. Skin:     General: Skin is warm and dry. Findings: No bruising, erythema, lesion or rash. Neurological:      General: No focal deficit present. Mental Status: He is alert and oriented to person, place, and time. Cranial Nerves: No cranial nerve deficit. Sensory: No sensory deficit. Motor: No weakness. Coordination: Coordination normal.      Gait: Gait normal.      Deep Tendon Reflexes: Reflexes normal.   Psychiatric:         Mood and Affect: Mood normal.         Behavior: Behavior normal.         Thought Content: Thought content normal.         Judgment: Judgment normal.        Assessment and plan     Plan of care has been discussed with the patient, he agrees to the plan and verbalized understanding. All his questions were answered  More than 50% of the time spent in this visit was counseling the patient about  illness and treatment options         1. Annual physical exam    - CBC WITH AUTOMATED DIFF; Future  - METABOLIC PANEL, COMPREHENSIVE; Future  - LIPID PANEL; Future  - HEMOGLOBIN A1C W/O EAG; Future  - TSH AND FREE T4; Future    2. Iron deficiency anemia, unspecified iron deficiency anemia type  He has been off iron supplements for over 10 month ,he has been eating healthy   - IRON PROFILE; Future  - FERRITIN; Future    3. Obesity, morbid ([facility] [address]  He lost 32 pounds by lifestyle modification  Continue lifestyle modification and regular exercise.  - HEMOGLOBIN A1C W/O EAG; Future  - TSH AND FREE T4; Future    4. Keratoconus of both eyes      5. Possible exposure to STD    - CHLAMYDIA/NEISSERIA AMPLIFICATION; Future  - T PALLIDUM AB; Future  - HEP B SURFACE AG; Future  - HIV 1/2 AG/AB, 4TH GENERATION,W RFLX CONFIRM; Future    6. Screening for diabetes mellitus (DM)    - HEMOGLOBIN A1C W/O EAG;  Future        Patient Active Problem List    Diagnosis Date Noted    Obesity, morbid ([facility] [address] 12/09/2019     Results for orders placed or performed during the hospital encounter of 77/78/01   METABOLIC PANEL, COMPREHENSIVE   Result Value Ref Range    Sodium 139 136 - 145 mmol/L    Potassium 4.3 3.5 - 5.5 mmol/L    Chloride 106 100 - 111 mmol/L    CO2 26 21 - 32 mmol/L    Anion gap 7 3.0 - 18 mmol/L    Glucose 84 74 - 99 mg/dL    BUN 8 7.0 - 18 MG/DL    Creatinine 0. 60 0.6 - 1.3 MG/DL    BUN/Creatinine ratio 13 12 - 20      GFR est AA >60 >60 ml/min/1.73m2    GFR est non-AA >60 >60 ml/min/1.73m2    Calcium 9.0 8.5 - 10.1 MG/DL    Bilirubin, total 0.3 0.2 - 1.0 MG/DL    ALT (SGPT) 33 16 - 61 U/L    AST (SGOT) 17 10 - 38 U/L    Alk. phosphatase 87 45 - 117 U/L    Protein, total 7.6 6.4 - 8.2 g/dL    Albumin 3.3 (L) 3.4 - 5.0 g/dL    Globulin 4.3 (H) 2.0 - 4.0 g/dL    A-G Ratio 0.8 0.8 - 1.7     LIPID PANEL   Result Value Ref Range    LIPID PROFILE          Cholesterol, total 150 <200 MG/DL    Triglyceride 80 <150 MG/DL    HDL Cholesterol 52 40 - 60 MG/DL    LDL, calculated 82 0 - 100 MG/DL    VLDL, calculated 16 MG/DL    CHOL/HDL Ratio 2.9 0 - 5.0     CBC WITH AUTOMATED DIFF   Result Value Ref Range    WBC 12.4 4.6 - 13.2 K/uL    RBC 5.33 4.70 - 5.50 M/uL    HGB 13.5 13.0 - 16.0 g/dL    HCT 42.0 36.0 - 48.0 %    MCV 78.8 74.0 - 97.0 FL    MCH 25.3 24.0 - 34.0 PG    MCHC 32.1 31.0 - 37.0 g/dL    RDW 16.9 (H) 11.6 - 14.5 %    PLATELET 778 223 - 625 K/uL    MPV 11.0 9.2 - 11.8 FL    NEUTROPHILS 78 (H) 40 - 73 %    LYMPHOCYTES 15 (L) 21 - 52 %    MONOCYTES 6 3 - 10 %    EOSINOPHILS 1 0 - 5 %    BASOPHILS 0 0 - 2 %    ABS. NEUTROPHILS 9.8 (H) 1.8 - 8.0 K/UL    ABS. LYMPHOCYTES 1.8 0.9 - 3.6 K/UL    ABS. MONOCYTES 0.7 0.05 - 1.2 K/UL    ABS. EOSINOPHILS 0.1 0.0 - 0.4 K/UL    ABS. BASOPHILS 0.0 0.0 - 0.1 K/UL    DF AUTOMATED     HEMOGLOBIN A1C W/O EAG   Result Value Ref Range    Hemoglobin A1c 5.5 4.2 - 5.6 %   TSH 3RD GENERATION   Result Value Ref Range    TSH 1.09 0.36 - 3.74 uIU/mL   IRON PROFILE   Result Value Ref Range    Iron 37 (L) 50 - 175 ug/dL    TIBC 289 250 - 450 ug/dL    Iron % saturation 13 (L) 20 - 50 %     No visits with results within 3 Month(s) from this visit.    Latest known visit with results is:   Hospital Outpatient Visit on 02/18/2021   Component Date Value Ref Range Status    Sodium 02/18/2021 139  136 - 145 mmol/L Final    Potassium 02/18/2021 4.3  3.5 - 5.5 mmol/L Final    Chloride 02/18/2021 106  100 - 111 mmol/L Final    CO2 02/18/2021 26  21 - 32 mmol/L Final    Anion gap 02/18/2021 7  3.0 - 18 mmol/L Final    Glucose 02/18/2021 84  74 - 99 mg/dL Final    BUN 02/18/2021 8  7.0 - 18 MG/DL Final    Creatinine 02/18/2021 0.60  0.6 - 1.3 MG/DL Final    BUN/Creatinine ratio 02/18/2021 13  12 - 20   Final    GFR est AA 02/18/2021 >60  >60 ml/min/1.73m2 Final    GFR est non-AA 02/18/2021 >60  >60 ml/min/1.73m2 Final    Comment: (NOTE)  Estimated GFR is calculated using the Modification of Diet in Renal   Disease (MDRD) Study equation, reported for both  Americans   (GFRAA) and non- Americans (GFRNA), and normalized to 1.73m2   body surface area. The physician must decide which value applies to   the patient. The MDRD study equation should only be used in   individuals age 25 or older. It has not been validated for the   following: pregnant women, patients with serious comorbid conditions,   or on certain medications, or persons with extremes of body size,   muscle mass, or nutritional status. Calcium 02/18/2021 9.0  8.5 - 10.1 MG/DL Final    Bilirubin, total 02/18/2021 0.3  0.2 - 1.0 MG/DL Final    ALT (SGPT) 02/18/2021 33  16 - 61 U/L Final    AST (SGOT) 02/18/2021 17  10 - 38 U/L Final    Alk. phosphatase 02/18/2021 87  45 - 117 U/L Final    Protein, total 02/18/2021 7.6  6.4 - 8.2 g/dL Final    Albumin 02/18/2021 3.3 (A) 3.4 - 5.0 g/dL Final    Globulin 02/18/2021 4.3 (A) 2.0 - 4.0 g/dL Final    A-G Ratio 02/18/2021 0.8  0.8 - 1.7   Final    LIPID PROFILE 02/18/2021        Final    Cholesterol, total 02/18/2021 150  <200 MG/DL Final    Triglyceride 02/18/2021 80  <150 MG/DL Final    Comment: The drugs N-acetylcysteine (NAC) and  Metamiszole have been found to cause falsely  low results in this chemical assay. Please  be sure to submit blood samples obtained  BEFORE administration of either of these  drugs to assure correct results.       HDL Cholesterol 02/18/2021 52  40 - 60 MG/DL Final    LDL, calculated 02/18/2021 82  0 - 100 MG/DL Final    VLDL, calculated 02/18/2021 16  MG/DL Final    CHOL/HDL Ratio 02/18/2021 2.9  0 - 5.0   Final    WBC 02/18/2021 12.4  4.6 - 13.2 K/uL Final    RBC 02/18/2021 5.33  4.70 - 5.50 M/uL Final    HGB 02/18/2021 13.5  13.0 - 16.0 g/dL Final    HCT 02/18/2021 42.0  36.0 - 48.0 % Final    MCV 02/18/2021 78.8  74.0 - 97.0 FL Final    MCH 02/18/2021 25.3  24.0 - 34.0 PG Final    MCHC 02/18/2021 32.1  31.0 - 37.0 g/dL Final    RDW 02/18/2021 16.9 (A) 11.6 - 14.5 % Final    PLATELET 20/17/2728 953  135 - 420 K/uL Final    MPV 02/18/2021 11.0  9.2 - 11.8 FL Final    NEUTROPHILS 02/18/2021 78 (A) 40 - 73 % Final    LYMPHOCYTES 02/18/2021 15 (A) 21 - 52 % Final    MONOCYTES 02/18/2021 6  3 - 10 % Final    EOSINOPHILS 02/18/2021 1  0 - 5 % Final    BASOPHILS 02/18/2021 0  0 - 2 % Final    ABS. NEUTROPHILS 02/18/2021 9.8 (A) 1.8 - 8.0 K/UL Final    ABS. LYMPHOCYTES 02/18/2021 1.8  0.9 - 3.6 K/UL Final    ABS. MONOCYTES 02/18/2021 0.7  0.05 - 1.2 K/UL Final    ABS. EOSINOPHILS 02/18/2021 0.1  0.0 - 0.4 K/UL Final    ABS. BASOPHILS 02/18/2021 0.0  0.0 - 0.1 K/UL Final    DF 02/18/2021 AUTOMATED    Final    Hemoglobin A1c 02/18/2021 5.5  4.2 - 5.6 % Final    Comment: (NOTE)  HbA1C Interpretive Ranges  <5.7              Normal  5.7 - 6.4         Consider Prediabetes  >6.5              Consider Diabetes      TSH 02/18/2021 1.09  0.36 - 3.74 uIU/mL Final    Iron 02/18/2021 37 (A) 50 - 175 ug/dL Final    Patients receiving metal-binding drugs (e.g. deferoxamine) may show spuriously depressed iron values, as chelated iron may not properly react in the iron assay.     TIBC 02/18/2021 289  250 - 450 ug/dL Final    Iron % saturation 02/18/2021 13 (A) 20 - 50 % Final

## 2022-07-21 LAB
C TRACH RRNA SPEC QL NAA+PROBE: NEGATIVE
HBV SURFACE AG SER QL: <0.1 INDEX
HBV SURFACE AG SER QL: NEGATIVE
N GONORRHOEA RRNA SPEC QL NAA+PROBE: NEGATIVE
SPECIMEN SOURCE: NORMAL
T PALLIDUM AB SER QL IA: NONREACTIVE

## 2022-07-22 LAB
HIV 1+2 AB+HIV1 P24 AG SERPL QL IA: NONREACTIVE
HIV12 RESULT COMMENT, HHIVC: NORMAL

## 2023-01-31 ENCOUNTER — OFFICE VISIT (OUTPATIENT)
Dept: FAMILY MEDICINE CLINIC | Age: 33
End: 2023-01-31
Payer: MEDICAID

## 2023-01-31 VITALS
HEART RATE: 87 BPM | BODY MASS INDEX: 50.62 KG/M2 | HEIGHT: 66 IN | WEIGHT: 315 LBS | OXYGEN SATURATION: 96 % | SYSTOLIC BLOOD PRESSURE: 130 MMHG | RESPIRATION RATE: 16 BRPM | DIASTOLIC BLOOD PRESSURE: 98 MMHG | TEMPERATURE: 97.3 F

## 2023-01-31 DIAGNOSIS — E66.01 OBESITY, MORBID (HCC): ICD-10-CM

## 2023-01-31 DIAGNOSIS — R03.0 ELEVATED BP WITHOUT DIAGNOSIS OF HYPERTENSION: ICD-10-CM

## 2023-01-31 DIAGNOSIS — R51.9 NEW ONSET HEADACHE: Primary | ICD-10-CM

## 2023-01-31 PROCEDURE — 99214 OFFICE O/P EST MOD 30 MIN: CPT | Performed by: LEGAL MEDICINE

## 2023-01-31 RX ORDER — FLUOROMETHOLONE 1 MG/ML
SOLUTION/ DROPS OPHTHALMIC
COMMUNITY
Start: 2023-01-06

## 2023-01-31 NOTE — PROGRESS NOTES
Maximino Hamman is a 33 y.o. male (: 1990) presenting to address:    Chief Complaint   Patient presents with    Head Pain     States head pain on left side x 3 days       Vitals:    23 1003   BP: (!) 140/100   Pulse: 87   Resp: 16   Temp: 97.3 °F (36.3 °C)   TempSrc: Temporal   SpO2: 96%   Weight: (!) 398 lb 6.4 oz (180.7 kg)   Height: 5' 6\" (1.676 m)   PainSc:   0 - No pain       Hearing/Vision:   No results found. Learning Assessment:     Learning Assessment 2019   PRIMARY LEARNER Patient   PRIMARY LANGUAGE ENGLISH   LEARNER PREFERENCE PRIMARY DEMONSTRATION     READING     VIDEOS     LISTENING     PICTURES   ANSWERED BY patient   RELATIONSHIP SELF     Depression Screening:     3 most recent PHQ Screens 2023   Little interest or pleasure in doing things Not at all   Feeling down, depressed, irritable, or hopeless Not at all   Total Score PHQ 2 0     Fall Risk Assessment:     Fall Risk Assessment, last 12 mths 2022   Able to walk? Yes   Fall in past 12 months? 0   Do you feel unsteady? 0   Are you worried about falling 0     Abuse Screening:     Abuse Screening Questionnaire 2022   Do you ever feel afraid of your partner? N   Are you in a relationship with someone who physically or mentally threatens you? N   Is it safe for you to go home? Y     ADL Assessment:   No flowsheet data found. Coordination of Care Questionaire:   1. \"Have you been to the ER, urgent care clinic since your last visit? Hospitalized since your last visit? \" No    2. \"Have you seen or consulted any other health care providers outside of the [address] [place] [address] since your last visit? \"  opthamology      3. For patients aged 39-70: Has the patient had a colonoscopy / FIT/ Cologuard? NA - based on age    If the patient is female:    4. For patients aged 41-77: Has the patient had a mammogram within the past 2 years? NA - based on age or sex  See top three    5.  For patients aged 21-65: Has the patient had a pap smear? NA - based on age or sex    Advanced Directive:   1. Do you have an Advanced Directive? NO    2. Would you like information on Advanced Directives?  NO

## 2023-01-31 NOTE — PROGRESS NOTES
Ketan Dang     Chief Complaint   Patient presents with    Head Pain     States head pain on left side x 3 days     Vitals:    01/31/23 1003 01/31/23 1040   BP: (!) 140/100 (!) 130/98   Pulse: 87    Resp: 16    Temp: 97.3 °F (36.3 °C)    TempSrc: Temporal    SpO2: 96%    Weight: (!) 398 lb 6.4 oz (180.7 kg)    Height: 5' 6\" (1.676 m)    PainSc:   0 - No pain          HPI: Ketan Dang is here for left side of his head is throbbing on and off for 3 days  pain comes random, few times a day ,it last for 2 minutes and  no specific triggers  or relieving factors     No visual changes , pain range from 4 to 7/10   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 Past Surgical History:   Procedure Laterality Date    HX APPENDECTOMY       Social History     Tobacco Use    Smoking status: Never    Smokeless tobacco: Never   Substance Use Topics    Alcohol use: Yes     Comment: very seldom, rare occasion       Family History   Problem Relation Age of Onset    No Known Problems Mother     No Known Problems Father        Review of Systems   Constitutional:  Negative for chills, fever, malaise/fatigue and weight loss. HENT:  Negative for congestion, ear discharge, ear pain, hearing loss, nosebleeds, sinus pain and sore throat. Eyes:  Negative for blurred vision, double vision and discharge. Respiratory:  Negative for cough and stridor. Cardiovascular:  Negative for chest pain, palpitations, claudication and leg swelling. Gastrointestinal:  Negative for abdominal pain, blood in stool, constipation, diarrhea, melena, nausea and vomiting. Genitourinary:  Negative for dysuria, frequency and urgency. Musculoskeletal:  Negative for myalgias. Skin:  Negative for itching and rash. Neurological:  Positive for dizziness and headaches. Negative for tingling, sensory change, speech change, focal weakness and weakness. Psychiatric/Behavioral:  Negative for depression and suicidal ideas. Physical Exam  Vitals and nursing note reviewed. Constitutional:       General: He is not in acute distress. Appearance: Normal appearance. He is well-developed. He is not ill-appearing, toxic-appearing or diaphoretic. HENT:      Head: Normocephalic and atraumatic. Eyes:      General: No scleral icterus. Right eye: No discharge. Left eye: No discharge. Conjunctiva/sclera: Conjunctivae normal.      Pupils: Pupils are equal, round, and reactive to light. Neck:      Thyroid: No thyromegaly. Cardiovascular:      Rate and Rhythm: Normal rate and regular rhythm. Heart sounds: Normal heart sounds. Pulmonary:      Effort: Pulmonary effort is normal. No respiratory distress. Breath sounds: Normal breath sounds. No rales. Abdominal:      General: Bowel sounds are normal. There is no distension. Palpations: Abdomen is soft. There is no mass. Tenderness: There is no abdominal tenderness. There is no rebound. Musculoskeletal:         General: No tenderness or deformity. Normal range of motion. Cervical back: Normal range of motion. No rigidity or tenderness. Right lower leg: No edema. Left lower leg: No edema. Lymphadenopathy:      Cervical: No cervical adenopathy. Skin:     General: Skin is warm and dry. Findings: No erythema or rash. Neurological:      Mental Status: He is alert and oriented to person, place, and time. Cranial Nerves: No cranial nerve deficit.       Coordination: Coordination normal.      Gait: Gait normal.   Psychiatric:         Mood and Affect: Mood normal. Behavior: Behavior normal.         Thought Content: Thought content normal.         Judgment: Judgment normal.        Assessment and plan     Plan of care has been discussed with the patient, he agrees to the plan and verbalized understanding. All his questions were answered  More than 50% of the time spent in this visit was counseling the patient about  illness and treatment options         1. New onset headache  Localized headaches with no history of trauma, no history of headaches, as per patient is getting slightly worse  To get the imaging for CT scan  - CT HEAD W WO CONT; Future    2. Elevated BP without diagnosis of hypertension  Advised patient about low-sodium and information were given to him regarding low-sodium diet      Morbid obesity BMI of 64  I discussed with patient referral to dietitian or bariatric surgery he declined he said he can lose weight on his own    Lifestyle modification has been discussed with patient in details, decrease carbohydrates, decrease or eliminate sugar intake, gradually increase physical activity as tolerated to be about 4 to 5 hours a week.      Current Outpatient Medications   Medication Sig Dispense Refill    fluorometholone (FML) 0.1 % ophthalmic suspension INSTILL 1 DROP INTO EACH EYE TWICE DAILY         Patient Active Problem List    Diagnosis Date Noted    Obesity, morbid ([facility] 12/09/2019     Results for orders placed or performed during the hospital encounter of 07/20/22   CBC WITH AUTOMATED DIFF   Result Value Ref Range    WBC 5.5 4.6 - 13.2 K/uL    RBC 5.61 4.35 - 5.65 M/uL    HGB 14.5 13.0 - 16.0 g/dL    HCT 46.0 36.0 - 48.0 %    MCV 82.0 78.0 - 100.0 FL    MCH 25.8 24.0 - 34.0 PG    MCHC 31.5 31.0 - 37.0 g/dL    RDW 16.4 (H) 11.6 - 14.5 %    PLATELET 886 717 - 838 K/uL    MPV 11.5 9.2 - 11.8 FL    NRBC 0.0 0  WBC    ABSOLUTE NRBC 0.00 0.00 - 0.01 K/uL    NEUTROPHILS 62 40 - 73 %    LYMPHOCYTES 26 21 - 52 %    MONOCYTES 11 (H) 3 - 10 %    EOSINOPHILS 0 0 - 5 %    BASOPHILS 0 0 - 2 %    IMMATURE GRANULOCYTES 0 0.0 - 0.5 %    ABS. NEUTROPHILS 3.4 1.8 - 8.0 K/UL    ABS. LYMPHOCYTES 1.5 0.9 - 3.6 K/UL    ABS. MONOCYTES 0.6 0.05 - 1.2 K/UL    ABS. EOSINOPHILS 0.0 0.0 - 0.4 K/UL    ABS. BASOPHILS 0.0 0.0 - 0.1 K/UL    ABS. IMM. GRANS. 0.0 0.00 - 0.04 K/UL    DF AUTOMATED     METABOLIC PANEL, COMPREHENSIVE   Result Value Ref Range    Sodium 140 136 - 145 mmol/L    Potassium 3.6 3.5 - 5.5 mmol/L    Chloride 108 100 - 111 mmol/L    CO2 26 21 - 32 mmol/L    Anion gap 6 3.0 - 18 mmol/L    Glucose 84 74 - 99 mg/dL    BUN 4 (L) 7.0 - 18 MG/DL    Creatinine 0.80 0.6 - 1.3 MG/DL    BUN/Creatinine ratio 5 (L) 12 - 20      GFR est AA >60 >60 ml/min/1.73m2    GFR est non-AA >60 >60 ml/min/1.73m2    Calcium 8.5 8.5 - 10.1 MG/DL    Bilirubin, total 0.4 0.2 - 1.0 MG/DL    ALT (SGPT) 43 16 - 61 U/L    AST (SGOT) 26 10 - 38 U/L    Alk. phosphatase 83 45 - 117 U/L    Protein, total 7.6 6.4 - 8.2 g/dL    Albumin 3.6 3.4 - 5.0 g/dL    Globulin 4.0 2.0 - 4.0 g/dL    A-G Ratio 0.9 0.8 - 1.7     LIPID PANEL   Result Value Ref Range    LIPID PROFILE          Cholesterol, total 141 <200 MG/DL    Triglyceride 88 <150 MG/DL    HDL Cholesterol 51 40 - 60 MG/DL    LDL, calculated 72.4 0 - 100 MG/DL    VLDL, calculated 17.6 MG/DL    CHOL/HDL Ratio 2.8 0 - 5.0     HEMOGLOBIN A1C W/O EAG   Result Value Ref Range    Hemoglobin A1c 5.4 4.2 - 5.6 %   TSH AND FREE T4   Result Value Ref Range    TSH 0.81 0.36 - 3.74 uIU/mL    T4, Free 1.3 0.7 - 1.5 NG/DL   IRON PROFILE   Result Value Ref Range    Iron 56 50 - 175 ug/dL    TIBC 314 250 - 450 ug/dL    Iron % saturation 18 (L) 20 - 50 %   FERRITIN   Result Value Ref Range    Ferritin 111 8 - 388 NG/ML   CHLAMYDIA/NEISSERIA AMPLIFICATION   Result Value Ref Range    Chlamydia amplification Negative NEG      N. gonorrhoeae amplification Negative NEG      Specimen Source URINE     T PALLIDUM AB   Result Value Ref Range    T. pallidum interpretation.  NONREACTIVE NR HEP B SURFACE AG   Result Value Ref Range    Hepatitis B surface Ag <0.10 <1.00 Index    Hep B surface Ag Interp. Negative NEG     HIV 1/2 AG/AB, 4TH GENERATION,W RFLX CONFIRM   Result Value Ref Range    HIV 1/2 Interpretation NONREACTIVE NR      HIV 1/2 result comment SEE NOTE       No visits with results within 3 Month(s) from this visit. Latest known visit with results is:   Hospital Outpatient Visit on 07/20/2022   Component Date Value Ref Range Status    WBC 07/20/2022 5.5  4.6 - 13.2 K/uL Final    RBC 07/20/2022 5.61  4.35 - 5.65 M/uL Final    HGB 07/20/2022 14.5  13.0 - 16.0 g/dL Final    HCT 07/20/2022 46.0  36.0 - 48.0 % Final    MCV 07/20/2022 82.0  78.0 - 100.0 FL Final    MCH 07/20/2022 25.8  24.0 - 34.0 PG Final    MCHC 07/20/2022 31.5  31.0 - 37.0 g/dL Final    RDW 07/20/2022 16.4 (A)  11.6 - 14.5 % Final    PLATELET 87/74/4547 420  135 - 420 K/uL Final    MPV 07/20/2022 11.5  9.2 - 11.8 FL Final    NRBC 07/20/2022 0.0  0  WBC Final    ABSOLUTE NRBC 07/20/2022 0.00  0.00 - 0.01 K/uL Final    NEUTROPHILS 07/20/2022 62  40 - 73 % Final    LYMPHOCYTES 07/20/2022 26  21 - 52 % Final    MONOCYTES 07/20/2022 11 (A)  3 - 10 % Final    EOSINOPHILS 07/20/2022 0  0 - 5 % Final    BASOPHILS 07/20/2022 0  0 - 2 % Final    IMMATURE GRANULOCYTES 07/20/2022 0  0.0 - 0.5 % Final    ABS. NEUTROPHILS 07/20/2022 3.4  1.8 - 8.0 K/UL Final    ABS. LYMPHOCYTES 07/20/2022 1.5  0.9 - 3.6 K/UL Final    ABS. MONOCYTES 07/20/2022 0.6  0.05 - 1.2 K/UL Final    ABS. EOSINOPHILS 07/20/2022 0.0  0.0 - 0.4 K/UL Final    ABS. BASOPHILS 07/20/2022 0.0  0.0 - 0.1 K/UL Final    ABS. IMM.  GRANS. 07/20/2022 0.0  0.00 - 0.04 K/UL Final    DF 07/20/2022 AUTOMATED    Final    Sodium 07/20/2022 140  136 - 145 mmol/L Final    Potassium 07/20/2022 3.6  3.5 - 5.5 mmol/L Final    Chloride 07/20/2022 108  100 - 111 mmol/L Final    CO2 07/20/2022 26  21 - 32 mmol/L Final    Anion gap 07/20/2022 6  3.0 - 18 mmol/L Final    Glucose 07/20/2022 84  74 - 99 mg/dL Final    BUN 07/20/2022 4 (A)  7.0 - 18 MG/DL Final    Creatinine 07/20/2022 0.80  0.6 - 1.3 MG/DL Final    BUN/Creatinine ratio 07/20/2022 5 (A)  12 - 20   Final    GFR est AA 07/20/2022 >60  >60 ml/min/1.73m2 Final    GFR est non-AA 07/20/2022 >60  >60 ml/min/1.73m2 Final    Comment: (NOTE)  Estimated GFR is calculated using the Modification of Diet in Renal   Disease (MDRD) Study equation, reported for both  Americans   (GFRAA) and non- Americans (GFRNA), and normalized to 1.73m2   body surface area. The physician must decide which value applies to   the patient. The MDRD study equation should only be used in   individuals age 25 or older. It has not been validated for the   following: pregnant women, patients with serious comorbid conditions,   or on certain medications, or persons with extremes of body size,   muscle mass, or nutritional status. Calcium 07/20/2022 8.5  8.5 - 10.1 MG/DL Final    Bilirubin, total 07/20/2022 0.4  0.2 - 1.0 MG/DL Final    ALT (SGPT) 07/20/2022 43  16 - 61 U/L Final    AST (SGOT) 07/20/2022 26  10 - 38 U/L Final    Alk. phosphatase 07/20/2022 83  45 - 117 U/L Final    Protein, total 07/20/2022 7.6  6.4 - 8.2 g/dL Final    Albumin 07/20/2022 3.6  3.4 - 5.0 g/dL Final    Globulin 07/20/2022 4.0  2.0 - 4.0 g/dL Final    A-G Ratio 07/20/2022 0.9  0.8 - 1.7   Final    LIPID PROFILE 07/20/2022        Final    Cholesterol, total 07/20/2022 141  <200 MG/DL Final    Triglyceride 07/20/2022 88  <150 MG/DL Final    Comment: The drugs N-acetylcysteine (NAC) and  Metamiszole have been found to cause falsely  low results in this chemical assay. Please  be sure to submit blood samples obtained  BEFORE administration of either of these  drugs to assure correct results.       HDL Cholesterol 07/20/2022 51  40 - 60 MG/DL Final    LDL, calculated 07/20/2022 72.4  0 - 100 MG/DL Final    VLDL, calculated 07/20/2022 17.6  MG/DL Final    CHOL/HDL Ratio 07/20/2022 2.8  0 - 5.0   Final    Hemoglobin A1c 07/20/2022 5.4  4.2 - 5.6 % Final    Comment: (NOTE)  HbA1C Interpretive Ranges  <5.7              Normal  5.7 - 6.4         Consider Prediabetes  >6.5              Consider Diabetes      TSH 07/20/2022 0.81  0.36 - 3.74 uIU/mL Final    T4, Free 07/20/2022 1.3  0.7 - 1.5 NG/DL Final    Iron 07/20/2022 56  50 - 175 ug/dL Final    Patients receiving metal-binding drugs (e.g. deferoxamine) may show spuriously depressed iron values, as chelated iron may not properly react in the iron assay. TIBC 07/20/2022 314  250 - 450 ug/dL Final    Iron % saturation 07/20/2022 18 (A)  20 - 50 % Final    Ferritin 07/20/2022 111  8 - 388 NG/ML Final    Chlamydia amplification 07/20/2022 Negative  NEG   Final    N. gonorrhoeae amplification 07/20/2022 Negative  NEG   Final    Testing performed by nucleic acid amplification method. Specimen Source 07/20/2022 URINE    Final    T. pallidum interpretation. 07/20/2022 NONREACTIVE  NR   Final    Comment: (NOTE)  A nonreactive test result does not exclude the possibility of   exposure to or infection with syphilis. T. pallidum antibodies may be   undetectable in some stages of the infection and in some clinical   conditions. Hepatitis B surface Ag 07/20/2022 <0.10  <1.00 Index Final    Hep B surface Ag Interp. 07/20/2022 Negative  NEG   Final    HIV 1/2 Interpretation 07/20/2022 NONREACTIVE  NR   Final    HIV 1/2 result comment 07/20/2022 SEE NOTE    Final    Comment: While this assay is highly sensitive, a non-reactive/negative result for HIV antibodies HIV-1 and HIV-2 and p24 antigen, does not exclude the possibility of exposure to or infection with HIV. HIV antibodies and/or p24 antigen may be undetectable in some stages of the infection and in some clinical conditions. Test performed by the [email or web address] HIV Ag/Ab Combo (CHIV), 4th generation assay.   Recommend consulting relevant CDC guidelines for informing test subject of the result and its interpretation. Follow-up and Dispositions    Return for office visit for follow up HTN.

## 2023-02-08 ENCOUNTER — TRANSCRIBE ORDERS (OUTPATIENT)
Facility: HOSPITAL | Age: 33
End: 2023-02-08

## 2023-02-08 DIAGNOSIS — R51.9 NEW ONSET HEADACHE: Primary | ICD-10-CM

## 2023-02-20 ENCOUNTER — TELEPHONE (OUTPATIENT)
Dept: FAMILY MEDICINE CLINIC | Facility: CLINIC | Age: 33
End: 2023-02-20

## 2023-02-20 DIAGNOSIS — G44.52 NEW DAILY PERSISTENT HEADACHE: Primary | ICD-10-CM

## 2023-02-20 NOTE — TELEPHONE ENCOUNTER
Received call from [address], [address] [place] [address], pt's insurance has denied the CT ordered on 2/8/23; do you want to order anything else? Pt is scheduled on 2/23/23, should this appt be cancelled.

## 2023-02-21 ENCOUNTER — TELEPHONE (OUTPATIENT)
Dept: FAMILY MEDICINE CLINIC | Facility: CLINIC | Age: 33
End: 2023-02-21

## 2023-02-21 NOTE — TELEPHONE ENCOUNTER
Patient need to follow-up for evaluation blood pressure  And he will be referred to neurologist for headaches evaluation

## 2023-02-21 NOTE — TELEPHONE ENCOUNTER
LM for pt to rtn call to schedule appt to check blood pressure; referral generated to neurology for headaches.

## 2023-02-21 NOTE — TELEPHONE ENCOUNTER
LM for pt to rtn call to schedule appt to check blood pressure; pt's insurance declined coverage of CT scan of head; pt can call [facility] to cancel appt for CT on 2/23/23.

## 2023-02-22 ENCOUNTER — TELEPHONE (OUTPATIENT)
Dept: FAMILY MEDICINE CLINIC | Facility: CLINIC | Age: 33
End: 2023-02-22

## 2023-02-22 NOTE — TELEPHONE ENCOUNTER
Pt's last note needs to be more descriptive regarding getting CT approved for headaches, note needs to be addended to state complaint is headaches, not head pain and the duration needs to be longer to get approval for CT scan of head.

## 2023-03-16 ENCOUNTER — OFFICE VISIT (OUTPATIENT)
Dept: FAMILY MEDICINE CLINIC | Facility: CLINIC | Age: 33
End: 2023-03-16
Payer: COMMERCIAL

## 2023-03-16 VITALS
OXYGEN SATURATION: 97 % | HEIGHT: 66 IN | SYSTOLIC BLOOD PRESSURE: 118 MMHG | DIASTOLIC BLOOD PRESSURE: 70 MMHG | RESPIRATION RATE: 18 BRPM | BODY MASS INDEX: 50.62 KG/M2 | WEIGHT: 315 LBS | TEMPERATURE: 97.2 F | HEART RATE: 81 BPM

## 2023-03-16 DIAGNOSIS — E66.01 CLASS 3 SEVERE OBESITY DUE TO EXCESS CALORIES WITHOUT SERIOUS COMORBIDITY WITH BODY MASS INDEX (BMI) OF 60.0 TO 69.9 IN ADULT (HCC): ICD-10-CM

## 2023-03-16 DIAGNOSIS — G44.52 NEW DAILY PERSISTENT HEADACHE: Primary | ICD-10-CM

## 2023-03-16 DIAGNOSIS — R03.0 ELEVATED BLOOD-PRESSURE READING, WITHOUT DIAGNOSIS OF HYPERTENSION: ICD-10-CM

## 2023-03-16 PROCEDURE — 99213 OFFICE O/P EST LOW 20 MIN: CPT | Performed by: LEGAL MEDICINE

## 2023-03-16 PROCEDURE — G8427 DOCREV CUR MEDS BY ELIG CLIN: HCPCS | Performed by: LEGAL MEDICINE

## 2023-03-16 PROCEDURE — 1036F TOBACCO NON-USER: CPT | Performed by: LEGAL MEDICINE

## 2023-03-16 PROCEDURE — G8417 CALC BMI ABV UP PARAM F/U: HCPCS | Performed by: LEGAL MEDICINE

## 2023-03-16 PROCEDURE — G8484 FLU IMMUNIZE NO ADMIN: HCPCS | Performed by: LEGAL MEDICINE

## 2023-03-16 SDOH — ECONOMIC STABILITY: FOOD INSECURITY: WITHIN THE PAST 12 MONTHS, THE FOOD YOU BOUGHT JUST DIDN'T LAST AND YOU DIDN'T HAVE MONEY TO GET MORE.: PATIENT DECLINED

## 2023-03-16 SDOH — ECONOMIC STABILITY: FOOD INSECURITY: WITHIN THE PAST 12 MONTHS, YOU WORRIED THAT YOUR FOOD WOULD RUN OUT BEFORE YOU GOT MONEY TO BUY MORE.: PATIENT DECLINED

## 2023-03-16 SDOH — ECONOMIC STABILITY: INCOME INSECURITY: HOW HARD IS IT FOR YOU TO PAY FOR THE VERY BASICS LIKE FOOD, HOUSING, MEDICAL CARE, AND HEATING?: PATIENT DECLINED

## 2023-03-16 ASSESSMENT — ANXIETY QUESTIONNAIRES
7. FEELING AFRAID AS IF SOMETHING AWFUL MIGHT HAPPEN: 0
3. WORRYING TOO MUCH ABOUT DIFFERENT THINGS: 0
6. BECOMING EASILY ANNOYED OR IRRITABLE: 0
2. NOT BEING ABLE TO STOP OR CONTROL WORRYING: 0
GAD7 TOTAL SCORE: 0
1. FEELING NERVOUS, ANXIOUS, OR ON EDGE: 0
IF YOU CHECKED OFF ANY PROBLEMS ON THIS QUESTIONNAIRE, HOW DIFFICULT HAVE THESE PROBLEMS MADE IT FOR YOU TO DO YOUR WORK, TAKE CARE OF THINGS AT HOME, OR GET ALONG WITH OTHER PEOPLE: NOT DIFFICULT AT ALL
5. BEING SO RESTLESS THAT IT IS HARD TO SIT STILL: 0
4. TROUBLE RELAXING: 0

## 2023-03-16 ASSESSMENT — ENCOUNTER SYMPTOMS
CHEST TIGHTNESS: 0
EYE PAIN: 0
CONSTIPATION: 0
RECTAL PAIN: 0
BLOOD IN STOOL: 0
BACK PAIN: 0
EYE DISCHARGE: 0
FACIAL SWELLING: 0
WHEEZING: 0
EYE ITCHING: 0
EYE REDNESS: 0
VOMITING: 0
ABDOMINAL PAIN: 0
NAUSEA: 0
ANAL BLEEDING: 0
DIARRHEA: 0
CHOKING: 0
APNEA: 0
SHORTNESS OF BREATH: 0
COUGH: 0
SORE THROAT: 0

## 2023-03-16 ASSESSMENT — PATIENT HEALTH QUESTIONNAIRE - PHQ9
2. FEELING DOWN, DEPRESSED OR HOPELESS: 0
SUM OF ALL RESPONSES TO PHQ QUESTIONS 1-9: 0
SUM OF ALL RESPONSES TO PHQ9 QUESTIONS 1 & 2: 0
SUM OF ALL RESPONSES TO PHQ QUESTIONS 1-9: 0
1. LITTLE INTEREST OR PLEASURE IN DOING THINGS: 0

## 2023-03-16 NOTE — PROGRESS NOTES
Jovanna Vitale     Chief Complaint   Patient presents with    Hypertension     C/o headaches     /70 (Site: Left Upper Arm, Position: Sitting, Cuff Size: Thigh)   Pulse 81   Temp 97.2 °F (36.2 °C) (Temporal)   Resp 18   Ht 5' 6\" (1.676 m)   Wt (!) 409 lb 12.8 oz (185.9 kg)   SpO2 97%   BMI 66.14 kg/m²         HPI:  Jovanna Vitale  is here for follow up on headaches ,it is better now , last episode was 2 week ago , blood pressure is normal today  He has no complaints today      No past medical history on file. Past Surgical History:   Procedure Laterality Date    APPENDECTOMY       Social History     Tobacco Use    Smoking status: Never    Smokeless tobacco: Never   Substance Use Topics    Alcohol use: Yes       Family History   Problem Relation Age of Onset    No Known Problems Mother     No Known Problems Father        Review of Systems   Constitutional:  Negative for activity change, appetite change, chills, diaphoresis, fatigue and fever. HENT:  Negative for congestion, ear discharge, ear pain, facial swelling, hearing loss and sore throat. Eyes:  Negative for pain, discharge, redness and itching. Respiratory:  Negative for apnea, cough, choking, chest tightness, shortness of breath and wheezing. Gastrointestinal:  Negative for abdominal pain, anal bleeding, blood in stool, constipation, diarrhea, nausea, rectal pain and vomiting. Endocrine: Negative for cold intolerance and heat intolerance. Genitourinary:  Negative for difficulty urinating, dysuria and flank pain. Musculoskeletal:  Negative for arthralgias, back pain, gait problem, joint swelling, myalgias, neck pain and neck stiffness. Skin:  Negative for rash. Neurological:  Negative for dizziness, facial asymmetry, light-headedness and headaches. Psychiatric/Behavioral:  Negative for agitation, behavioral problems, confusion, sleep disturbance and suicidal ideas. The patient is not nervous/anxious.         Physical Exam  Vitals and nursing note reviewed. Constitutional:       General: He is not in acute distress. Appearance: Normal appearance. He is obese. He is not ill-appearing, toxic-appearing or diaphoretic. HENT:      Nose: No congestion or rhinorrhea. Mouth/Throat:      Pharynx: No oropharyngeal exudate or posterior oropharyngeal erythema. Eyes:      General: No scleral icterus. Right eye: No discharge. Left eye: No discharge. Cardiovascular:      Pulses: Normal pulses. Heart sounds: Normal heart sounds. No murmur heard. Pulmonary:      Effort: Pulmonary effort is normal. No respiratory distress. Breath sounds: Normal breath sounds. No stridor. No wheezing or rhonchi. Chest:      Chest wall: No tenderness. Abdominal:      General: There is no distension. Palpations: There is no mass. Tenderness: There is no abdominal tenderness. There is no right CVA tenderness, left CVA tenderness, guarding or rebound. Hernia: No hernia is present. Musculoskeletal:         General: No swelling, tenderness or deformity. Normal range of motion. Cervical back: Normal range of motion and neck supple. No rigidity or tenderness. Right lower leg: No edema. Left lower leg: No edema. Skin:     General: Skin is warm and dry. Coloration: Skin is not jaundiced. Findings: No bruising, erythema, lesion or rash. Neurological:      General: No focal deficit present. Mental Status: He is alert and oriented to person, place, and time. Cranial Nerves: No cranial nerve deficit. Sensory: No sensory deficit. Motor: No weakness. Coordination: Coordination normal.      Gait: Gait normal.      Deep Tendon Reflexes: Reflexes normal.   Psychiatric:         Mood and Affect: Mood normal.         Behavior: Behavior normal.         Thought Content:  Thought content normal.         Judgment: Judgment normal.        Assessment and plan     Plan of care has been discussed with the patient, he agrees to the plan and verbalized understanding. All his questions were answered  More than 50% of the time spent in this visit was counseling the patient about  illness and treatment options         ICD-10-CM    1. New daily persistent headache  G44.52       2. Class 3 severe obesity due to excess calories without serious comorbidity with body mass index (BMI) of 60.0 to 69.9 in adult [facility])  E66.01 [facility] Andrew Capellan CNP, Weight Loss, [place]   Patient be referred to weight loss program Z68.44       3. Elevated blood-pressure reading, without diagnosis of hypertension  R03.0    Blood pressure within normal limits      No current outpatient medications on file. No current facility-administered medications for this visit. Patient Active Problem List    Diagnosis Date Noted    Obesity, morbid ([facility] [address] 12/09/2019     No results found for this visit on 03/16/23. No visits with results within 3 Month(s) from this visit. Latest known visit with results is:   No results found for any previous visit. Return in about 4 months (around 7/24/2023).

## 2023-03-16 NOTE — PROGRESS NOTES
Sharron Rios is a 29 y.o. male (: 1990) presenting to address:    Chief Complaint   Patient presents with    Hypertension     C/o headaches       Vitals:    23 1104   BP: 118/70   Pulse: 81   Resp: 18   Temp: 97.2 °F (36.2 °C)   SpO2: 97%       Coordination of Care Questionaire:   1. \"Have you been to the ER, urgent care clinic since your last visit? Hospitalized since your last visit? \" No    2. \"Have you seen or consulted any other health care providers outside of the [address] [place] [address] since your last visit? \" No     3. For patients aged 39-70: Has the patient had a colonoscopy / FIT/ Cologuard? NA - based on age      If the patient is female:    4. For patients aged 41-77: Has the patient had a mammogram within the past 2 years? NA - based on age or sex      11. For patients aged 21-65: Has the patient had a pap smear? NA - based on age or sex    Advanced Directive:   1. Do you have an Advanced Directive? No    2. Would you like information on Advanced Directives?  No

## 2023-05-30 ENCOUNTER — OFFICE VISIT (OUTPATIENT)
Age: 33
End: 2023-05-30
Payer: COMMERCIAL

## 2023-05-30 VITALS
WEIGHT: 315 LBS | HEIGHT: 66 IN | RESPIRATION RATE: 18 BRPM | OXYGEN SATURATION: 98 % | SYSTOLIC BLOOD PRESSURE: 130 MMHG | HEART RATE: 74 BPM | BODY MASS INDEX: 50.62 KG/M2 | DIASTOLIC BLOOD PRESSURE: 80 MMHG

## 2023-05-30 DIAGNOSIS — G47.30 SLEEP DISORDER BREATHING: ICD-10-CM

## 2023-05-30 DIAGNOSIS — R51.9 NONINTRACTABLE HEADACHE, UNSPECIFIED CHRONICITY PATTERN, UNSPECIFIED HEADACHE TYPE: Primary | ICD-10-CM

## 2023-05-30 PROCEDURE — 1036F TOBACCO NON-USER: CPT | Performed by: PSYCHIATRY & NEUROLOGY

## 2023-05-30 PROCEDURE — G8428 CUR MEDS NOT DOCUMENT: HCPCS | Performed by: PSYCHIATRY & NEUROLOGY

## 2023-05-30 PROCEDURE — G8417 CALC BMI ABV UP PARAM F/U: HCPCS | Performed by: PSYCHIATRY & NEUROLOGY

## 2023-05-30 PROCEDURE — 99203 OFFICE O/P NEW LOW 30 MIN: CPT | Performed by: PSYCHIATRY & NEUROLOGY

## 2023-05-30 RX ORDER — ASCORBIC ACID 500 MG
500 TABLET ORAL DAILY
COMMUNITY

## 2023-05-30 NOTE — PROGRESS NOTES
HISTORY AND EXAM  29year old left handed male on disability for injuries, patient referred for evaluation of headaches started few months ago, describe as   throbbing over the left side of the head mostly frontal comes and goes 2-3/month last 2-5 minutes without any triggers associated with head twitch or spasm, history of blurred vision from eye problems, denies paresthesias, dizziness, denies memory issues, insomnia wake up holding his breath, never had brain imaging in the past, independent with activities of daily living, have regular headaches as well all over head 3-4 times a week, get them 1/week, 3-4/10 in intensity but no other associated neurological complains. Family history NC, denies depression, smoking or drugs. Social History     Socioeconomic History    Marital status: Unknown     Spouse name: Not on file    Number of children: Not on file    Years of education: Not on file    Highest education level: Not on file   Occupational History    Not on file   Tobacco Use    Smoking status: Never    Smokeless tobacco: Never   Substance and Sexual Activity    Alcohol use: Yes    Drug use: Not Currently    Sexual activity: Not on file   Other Topics Concern    Not on file   Social History Narrative    Not on file     Social Determinants of Health     Financial Resource Strain: Unknown    Difficulty of Paying Living Expenses: Patient refused   Food Insecurity: Unknown    Worried About Running Out of Food in the Last Year: Patient refused    [address] in the Last Year: Patient refused   Transportation Needs: Unknown    Lack of Transportation (Medical): Not on file    Lack of Transportation (Non-Medical):  No   Physical Activity: Not on file   Stress: Not on file   Social Connections: Not on file   Intimate Partner Violence: Not on file   Housing Stability: Not on file       Family History   Problem Relation Age of Onset    No Known Problems Mother     No Known Problems Father         Current Outpatient

## 2023-07-20 ENCOUNTER — OFFICE VISIT (OUTPATIENT)
Age: 33
End: 2023-07-20

## 2023-07-20 VITALS
TEMPERATURE: 98 F | WEIGHT: 315 LBS | DIASTOLIC BLOOD PRESSURE: 87 MMHG | HEART RATE: 81 BPM | BODY MASS INDEX: 50.62 KG/M2 | SYSTOLIC BLOOD PRESSURE: 137 MMHG | HEIGHT: 66 IN | OXYGEN SATURATION: 98 % | RESPIRATION RATE: 17 BRPM

## 2023-07-20 DIAGNOSIS — E66.01 MORBID OBESITY (HCC): ICD-10-CM

## 2023-07-20 DIAGNOSIS — Z71.3 WEIGHT LOSS COUNSELING, ENCOUNTER FOR: Primary | ICD-10-CM

## 2023-07-20 DIAGNOSIS — Z71.3 ENCOUNTER FOR DIETARY CONSULTATION: ICD-10-CM

## 2023-07-20 RX ORDER — OLOPATADINE HYDROCHLORIDE OPHTHALMIC 2 MG/ML
SOLUTION OPHTHALMIC
COMMUNITY
Start: 2023-04-30

## 2023-07-20 RX ORDER — DIFLUPREDNATE OPHTHALMIC 0.5 MG/ML
1 EMULSION OPHTHALMIC 4 TIMES DAILY
COMMUNITY

## 2023-07-20 RX ORDER — SEMAGLUTIDE 0.25 MG/.5ML
0.25 INJECTION, SOLUTION SUBCUTANEOUS
Qty: 3 ML | Refills: 0 | Status: SHIPPED | OUTPATIENT
Start: 2023-07-20

## 2023-07-20 RX ORDER — SEMAGLUTIDE 0.25 MG/.5ML
0.25 INJECTION, SOLUTION SUBCUTANEOUS
Qty: 3 ML | Refills: 0 | Status: SHIPPED | OUTPATIENT
Start: 2023-07-20 | End: 2023-07-20 | Stop reason: SDUPTHER

## 2023-07-20 ASSESSMENT — ENCOUNTER SYMPTOMS
ALLERGIC/IMMUNOLOGIC NEGATIVE: 1
RESPIRATORY NEGATIVE: 1
GASTROINTESTINAL NEGATIVE: 1
EYES NEGATIVE: 1

## 2023-07-20 NOTE — PROGRESS NOTES
Weight Loss Progress Note: Initial Physician Visit      Antonio Okeefe is a 29 y.o. male with BMI 65.7 who is here for his initial evaluation for the medical weight loss medication program. Patient has not previously done a [facility] program, but wants to lose weight to get healthier. CC: Morbid Obesity    Weight History  Current weight 406lb   Goal weight 250-260lbs  Highest weight 416lbs   (See weight gain time line scanned into media section of chart)    Food intolerances (gas, bloating, diarrhea, vomiting)? Dairy. Weight loss History  How many weight loss attempts have you had? No serious attempts prior. Which program were you most successful doing? Some exercise and dieting. Significant Medical History    Have you ever taken appetite suppressants? No.  If yes: Rx or OTC? If yes; Any negative side effects? Ever diagnosed with sleep apnea or put on CPAP? No.    Ever had bariatric surgery?: No.    Pregnant or planning on becoming pregnant w/in 6 months: No.      Significant Psychosocial History   Any history of drug abuse or dependence: No.  Current Major Lifestyle Changes: No.  Any potential unsupportive people: No.  Why are you starting a weight loss program now? Patient desires to lose weight to improve his health and avoid future health problems. Are you ready? Yes. History of binge eating disorder or anorexia: No.  If yes, are you currently being treated? Social History  Social History     Tobacco Use    Smoking status: Never    Smokeless tobacco: Never   Substance Use Topics    Alcohol use: Yes     Comment: Occasional     How many times a week do you eat out? 2-3 times a month. Mostly eats from home. Do you drink any EtOH? Yes. If so, how much? Occasional.      Exercise  How many days a week do you currently exercise?  0 days. Have you ever been told by a physician not to exercise?      Objective  Vitals:    07/20/23 1026 07/20/23 1053   BP: (!) 139/40 137/87   Site: Right

## 2023-07-24 ENCOUNTER — OFFICE VISIT (OUTPATIENT)
Dept: FAMILY MEDICINE CLINIC | Facility: CLINIC | Age: 33
End: 2023-07-24
Payer: MEDICAID

## 2023-07-24 ENCOUNTER — HOSPITAL ENCOUNTER (OUTPATIENT)
Facility: HOSPITAL | Age: 33
Setting detail: SPECIMEN
Discharge: HOME | End: 2023-07-27
Payer: MEDICAID

## 2023-07-24 VITALS
DIASTOLIC BLOOD PRESSURE: 80 MMHG | TEMPERATURE: 98.2 F | SYSTOLIC BLOOD PRESSURE: 122 MMHG | BODY MASS INDEX: 50.62 KG/M2 | WEIGHT: 315 LBS | OXYGEN SATURATION: 97 % | HEART RATE: 97 BPM | RESPIRATION RATE: 16 BRPM | HEIGHT: 66 IN

## 2023-07-24 DIAGNOSIS — L60.3 NAIL BRITTLENESS: ICD-10-CM

## 2023-07-24 DIAGNOSIS — H18.603 KERATOCONUS, UNSPECIFIED, BILATERAL: ICD-10-CM

## 2023-07-24 DIAGNOSIS — Z13.1 SCREENING FOR DIABETES MELLITUS (DM): ICD-10-CM

## 2023-07-24 DIAGNOSIS — E55.9 VITAMIN D DEFICIENCY: ICD-10-CM

## 2023-07-24 DIAGNOSIS — Z00.00 ANNUAL PHYSICAL EXAM: ICD-10-CM

## 2023-07-24 DIAGNOSIS — E66.01 OBESITY, MORBID (HCC): ICD-10-CM

## 2023-07-24 DIAGNOSIS — L53.9 ERYTHEMA OF FOOT: ICD-10-CM

## 2023-07-24 DIAGNOSIS — Z11.59 NEED FOR HEPATITIS C SCREENING TEST: ICD-10-CM

## 2023-07-24 DIAGNOSIS — L60.8 DISCOLORATION OF NAIL: ICD-10-CM

## 2023-07-24 DIAGNOSIS — Z00.00 ANNUAL PHYSICAL EXAM: Primary | ICD-10-CM

## 2023-07-24 LAB
25(OH)D3 SERPL-MCNC: 19.8 NG/ML (ref 30–100)
BASOPHILS # BLD: 0 K/UL (ref 0–0.1)
BASOPHILS NFR BLD: 0 % (ref 0–2)
DIFFERENTIAL METHOD BLD: ABNORMAL
EOSINOPHIL # BLD: 0.1 K/UL (ref 0–0.4)
EOSINOPHIL NFR BLD: 1 % (ref 0–5)
ERYTHROCYTE [DISTWIDTH] IN BLOOD BY AUTOMATED COUNT: 16.4 % (ref 11.6–14.5)
EST. AVERAGE GLUCOSE BLD GHB EST-MCNC: 114 MG/DL
HBA1C MFR BLD: 5.6 % (ref 4.2–5.6)
HCT VFR BLD AUTO: 41.4 % (ref 36–48)
HGB BLD-MCNC: 13.1 G/DL (ref 13–16)
IMM GRANULOCYTES # BLD AUTO: 0 K/UL (ref 0–0.04)
IMM GRANULOCYTES NFR BLD AUTO: 0 % (ref 0–0.5)
LYMPHOCYTES # BLD: 1.4 K/UL (ref 0.9–3.6)
LYMPHOCYTES NFR BLD: 15 % (ref 21–52)
MCH RBC QN AUTO: 25.7 PG (ref 24–34)
MCHC RBC AUTO-ENTMCNC: 31.6 G/DL (ref 31–37)
MCV RBC AUTO: 81.3 FL (ref 78–100)
MONOCYTES # BLD: 0.6 K/UL (ref 0.05–1.2)
MONOCYTES NFR BLD: 7 % (ref 3–10)
NEUTS SEG # BLD: 7.2 K/UL (ref 1.8–8)
NEUTS SEG NFR BLD: 77 % (ref 40–73)
NRBC # BLD: 0 K/UL (ref 0–0.01)
NRBC BLD-RTO: 0 PER 100 WBC
PLATELET # BLD AUTO: 287 K/UL (ref 135–420)
PMV BLD AUTO: 11.7 FL (ref 9.2–11.8)
RBC # BLD AUTO: 5.09 M/UL (ref 4.35–5.65)
WBC # BLD AUTO: 9.4 K/UL (ref 4.6–13.2)

## 2023-07-24 PROCEDURE — 83036 HEMOGLOBIN GLYCOSYLATED A1C: CPT

## 2023-07-24 PROCEDURE — 86803 HEPATITIS C AB TEST: CPT

## 2023-07-24 PROCEDURE — 80053 COMPREHEN METABOLIC PANEL: CPT

## 2023-07-24 PROCEDURE — 80061 LIPID PANEL: CPT

## 2023-07-24 PROCEDURE — 99395 PREV VISIT EST AGE 18-39: CPT | Performed by: LEGAL MEDICINE

## 2023-07-24 PROCEDURE — 85025 COMPLETE CBC W/AUTO DIFF WBC: CPT

## 2023-07-24 PROCEDURE — 82306 VITAMIN D 25 HYDROXY: CPT

## 2023-07-24 PROCEDURE — 36415 COLL VENOUS BLD VENIPUNCTURE: CPT

## 2023-07-24 SDOH — ECONOMIC STABILITY: INCOME INSECURITY: HOW HARD IS IT FOR YOU TO PAY FOR THE VERY BASICS LIKE FOOD, HOUSING, MEDICAL CARE, AND HEATING?: NOT HARD AT ALL

## 2023-07-24 SDOH — ECONOMIC STABILITY: FOOD INSECURITY: WITHIN THE PAST 12 MONTHS, THE FOOD YOU BOUGHT JUST DIDN'T LAST AND YOU DIDN'T HAVE MONEY TO GET MORE.: NEVER TRUE

## 2023-07-24 SDOH — ECONOMIC STABILITY: FOOD INSECURITY: WITHIN THE PAST 12 MONTHS, YOU WORRIED THAT YOUR FOOD WOULD RUN OUT BEFORE YOU GOT MONEY TO BUY MORE.: NEVER TRUE

## 2023-07-24 SDOH — ECONOMIC STABILITY: HOUSING INSECURITY
IN THE LAST 12 MONTHS, WAS THERE A TIME WHEN YOU DID NOT HAVE A STEADY PLACE TO SLEEP OR SLEPT IN A SHELTER (INCLUDING NOW)?: NO

## 2023-07-24 ASSESSMENT — ENCOUNTER SYMPTOMS
SHORTNESS OF BREATH: 0
FACIAL SWELLING: 0
CONSTIPATION: 0
VOICE CHANGE: 0
EYE ITCHING: 0
CHOKING: 0
WHEEZING: 0
SORE THROAT: 0
ABDOMINAL PAIN: 0
APNEA: 0
EYE REDNESS: 0
BLOOD IN STOOL: 0
ANAL BLEEDING: 0
NAUSEA: 0
EYE PAIN: 0
EYE DISCHARGE: 0
DIARRHEA: 0
CHEST TIGHTNESS: 0
BACK PAIN: 0
TROUBLE SWALLOWING: 0
COUGH: 0
VOMITING: 0

## 2023-07-25 ENCOUNTER — TELEPHONE (OUTPATIENT)
Dept: FAMILY MEDICINE CLINIC | Facility: CLINIC | Age: 33
End: 2023-07-25

## 2023-07-25 LAB
ALBUMIN SERPL-MCNC: 3.3 G/DL (ref 3.4–5)
ALBUMIN/GLOB SERPL: 0.8 (ref 0.8–1.7)
ALP SERPL-CCNC: 82 U/L (ref 45–117)
ALT SERPL-CCNC: 43 U/L (ref 16–61)
ANION GAP SERPL CALC-SCNC: 4 MMOL/L (ref 3–18)
AST SERPL-CCNC: 16 U/L (ref 10–38)
BILIRUB SERPL-MCNC: 0.4 MG/DL (ref 0.2–1)
BUN SERPL-MCNC: 6 MG/DL (ref 7–18)
BUN/CREAT SERPL: 7 (ref 12–20)
CALCIUM SERPL-MCNC: 8.6 MG/DL (ref 8.5–10.1)
CHLORIDE SERPL-SCNC: 107 MMOL/L (ref 100–111)
CHOLEST SERPL-MCNC: 164 MG/DL
CO2 SERPL-SCNC: 27 MMOL/L (ref 21–32)
CREAT SERPL-MCNC: 0.82 MG/DL (ref 0.6–1.3)
GLOBULIN SER CALC-MCNC: 4.4 G/DL (ref 2–4)
GLUCOSE SERPL-MCNC: 85 MG/DL (ref 74–99)
HCV AB SER IA-ACNC: 0.09 INDEX
HCV AB SERPL QL IA: NEGATIVE
HDLC SERPL-MCNC: 61 MG/DL (ref 40–60)
HDLC SERPL: 2.7 (ref 0–5)
LDLC SERPL CALC-MCNC: 93 MG/DL (ref 0–100)
LIPID PANEL: ABNORMAL
Lab: NORMAL
POTASSIUM SERPL-SCNC: 4.1 MMOL/L (ref 3.5–5.5)
PROT SERPL-MCNC: 7.7 G/DL (ref 6.4–8.2)
SODIUM SERPL-SCNC: 138 MMOL/L (ref 136–145)
TRIGL SERPL-MCNC: 50 MG/DL
VLDLC SERPL CALC-MCNC: 10 MG/DL

## 2023-07-27 RX ORDER — ERGOCALCIFEROL 1.25 MG/1
50000 CAPSULE ORAL WEEKLY
Qty: 12 CAPSULE | Refills: 1 | Status: SHIPPED | OUTPATIENT
Start: 2023-07-27

## 2023-08-22 ENCOUNTER — OFFICE VISIT (OUTPATIENT)
Age: 33
End: 2023-08-22
Payer: MEDICAID

## 2023-08-22 VITALS
RESPIRATION RATE: 17 BRPM | HEIGHT: 66 IN | TEMPERATURE: 98 F | BODY MASS INDEX: 50.62 KG/M2 | OXYGEN SATURATION: 98 % | DIASTOLIC BLOOD PRESSURE: 86 MMHG | WEIGHT: 315 LBS | SYSTOLIC BLOOD PRESSURE: 131 MMHG | HEART RATE: 75 BPM

## 2023-08-22 DIAGNOSIS — Z71.3 ENCOUNTER FOR DIETARY CONSULTATION: ICD-10-CM

## 2023-08-22 DIAGNOSIS — E66.01 MORBID OBESITY (HCC): Primary | ICD-10-CM

## 2023-08-22 DIAGNOSIS — Z71.3 WEIGHT LOSS COUNSELING, ENCOUNTER FOR: ICD-10-CM

## 2023-08-22 PROCEDURE — 99213 OFFICE O/P EST LOW 20 MIN: CPT | Performed by: NURSE PRACTITIONER

## 2023-08-22 ASSESSMENT — ENCOUNTER SYMPTOMS
RESPIRATORY NEGATIVE: 1
EYES NEGATIVE: 1

## 2023-08-22 NOTE — PROGRESS NOTES
[facility] Progress Note:   F/up Provider Visit    Chief Complaint   Patient presents with    Follow-up     Medical Weight Loss - Kathrin Del Cid is a 29 y.o. male who is here for his  f/up medical weight loss visit for the grocery/medication program. Patient did fairly well this past month he is down 15lbs overall this visit.    -15 lbs lost.   Arm Circumference: 17.5in  Waist Circumference: 64in  Thigh Circumference: 19in  Percent Body Fat: 48.4%      Progress and challenges thus far. Patient has been unable to find a pharmacy with [facility] in [address] [place] [address], but in the interim he has started to implement some of the dietary guidelines we discussed such as ensuring that he has 3 meals per day with the focus being on high protein and vegetables. He now drinks a protein shake for hs first marvin and his dinner consists more of baked chicken and steamed vegetables. Diet? Low carb/sugar/starch, high protein/vegetable. Did you have any problems adhering to the program last week? No.  If yes, please explain:     Since your last visit, have you experienced any complications? No.    Have you received any other medical care this week? No.  If yes, where and for what? Have you had any change in your medications since your last visit? No.  If yes what? Are you taking an appetite suppressant? Wegovy on backorder - pt has not been able to  from pharmacy. If yes:  Do you need a refill? BP Readings from Last 3 Encounters:   08/22/23 131/86   07/24/23 122/80   07/20/23 137/87        Eating Habits Over Last Week:  Did you take in 64 oz of non-caloric fluids? Yes. Did you consume your prescribed meal replacement regimen each day? Yes.       Physical Activity Over the Past Week:    Aerobic exercise: 0 min  Resistance exercise: 0 workouts / week      Weight History  Starting wt: 412lbs  Goal wt: 250-260lbs  EKG due: None  Ideal body weight: 63.8 kg (140 lb 10.5 oz)  Adjusted ideal

## 2023-09-07 ENCOUNTER — OFFICE VISIT (OUTPATIENT)
Age: 33
End: 2023-09-07
Payer: MEDICAID

## 2023-09-07 VITALS
HEIGHT: 66 IN | HEART RATE: 92 BPM | WEIGHT: 315 LBS | SYSTOLIC BLOOD PRESSURE: 150 MMHG | OXYGEN SATURATION: 96 % | RESPIRATION RATE: 18 BRPM | BODY MASS INDEX: 50.62 KG/M2 | DIASTOLIC BLOOD PRESSURE: 100 MMHG

## 2023-09-07 DIAGNOSIS — R51.9 NONINTRACTABLE HEADACHE, UNSPECIFIED CHRONICITY PATTERN, UNSPECIFIED HEADACHE TYPE: Primary | ICD-10-CM

## 2023-09-07 PROCEDURE — 99212 OFFICE O/P EST SF 10 MIN: CPT | Performed by: NURSE PRACTITIONER

## 2023-09-07 NOTE — PATIENT INSTRUCTIONS
Patient instructions:  -sleep specialist referral: Sleep specialist- [address] [facility]- [phone] [facility] - Tonya Reyes, [address] [facility], [address]    [place]   [phone]  -follow up with PCP for BP  -new MRI order placed

## 2023-09-07 NOTE — PROGRESS NOTES
[address] [place] [address] Drive  [address] [facility]. [address] [place] [address]  Office:  [phone]  Fax: [phone]  Chief Complaint   Patient presents with    Follow-up       HPI: Amaury Castro presents in follow-up for headaches. He was last seen on 5/30/2023 by Dr. Zach Engel in initial evaluation for headaches. History as below. \"HISTORY AND EXAM  29year old left handed male on disability for injuries, patient referred for evaluation of headaches started few months ago, describe as   throbbing over the left side of the head mostly frontal comes and goes 2-3/month last 2-5 minutes without any triggers associated with head twitch or spasm, history of blurred vision from eye problems, denies paresthesias, dizziness, denies memory issues, insomnia wake up holding his breath, never had brain imaging in the past, independent with activities of daily living, have regular headaches as well all over head 3-4 times a week, get them 1/week, 3-4/10 in intensity but no other associated neurological complains. Family history [place], denies depression, smoking or drugs. \"    He presents today in follow-up. He reports lately he has not been having the headaches, not extreme, but once in a while will be throbbing, can't do anything for a few minutes. Tries to not focus on it and do something else. Has to sit there and wait it out. They last 3-4 min. Hasn't had them recently. Last had them about 6 weeks ago. No head twitch or spasm- last was a few weeks ago. It was like a head jerk to the side. He has hx of keratoconus (cone shaped cornea). Sees ophthalmologist.  Had crosslinking procedure done on right eye to stop advancement. Saw them last in August.  Everything else noted from them ok, next appt in Jan.  Will see someone for contact next month for new lenses. Endorses some numbness- knee, leg, right arm. May be position related.  Headaches are not associated with photo or phonophobia, N/V, weakness,

## 2023-09-26 ENCOUNTER — OFFICE VISIT (OUTPATIENT)
Age: 33
End: 2023-09-26
Payer: MEDICAID

## 2023-09-26 VITALS
DIASTOLIC BLOOD PRESSURE: 86 MMHG | SYSTOLIC BLOOD PRESSURE: 141 MMHG | TEMPERATURE: 97.9 F | OXYGEN SATURATION: 99 % | BODY MASS INDEX: 50.62 KG/M2 | HEART RATE: 84 BPM | RESPIRATION RATE: 17 BRPM | WEIGHT: 315 LBS | HEIGHT: 66 IN

## 2023-09-26 DIAGNOSIS — Z71.3 ENCOUNTER FOR DIETARY CONSULTATION: ICD-10-CM

## 2023-09-26 DIAGNOSIS — E66.01 MORBID OBESITY (HCC): Primary | ICD-10-CM

## 2023-09-26 DIAGNOSIS — Z71.3 WEIGHT LOSS COUNSELING, ENCOUNTER FOR: ICD-10-CM

## 2023-09-26 PROCEDURE — 99213 OFFICE O/P EST LOW 20 MIN: CPT | Performed by: NURSE PRACTITIONER

## 2023-09-26 ASSESSMENT — ENCOUNTER SYMPTOMS
GASTROINTESTINAL NEGATIVE: 1
RESPIRATORY NEGATIVE: 1
EYES NEGATIVE: 1
ALLERGIC/IMMUNOLOGIC NEGATIVE: 1

## 2023-09-26 NOTE — PROGRESS NOTES
reviewed with patient   Routine monitoring labs ordered  No orders of the defined types were placed in this encounter. There were no encounter diagnoses.               Rosette Rm, JENNIFER - CNP

## 2023-10-09 ENCOUNTER — TELEPHONE (OUTPATIENT)
Dept: FAMILY MEDICINE CLINIC | Facility: CLINIC | Age: 33
End: 2023-10-09

## 2023-10-09 NOTE — TELEPHONE ENCOUNTER
Pao from [employer] called on behalf of the pt stating that they received a referral for CT scan for the pt. That referral has been denied due to notes that were faxed with referral. Zenobia Mckenna wanted to know if there were current notes for the pt. No current notes available. Zenobia Mckenna would  like to know if staff would reach out to pt to see if she would like proceed with getting the CT done. Zenobia Mckenna stated that if the pt would like to proceed they would need a peer to peer with provider. Informed Zenobia Mckenna that pt's provider no longer works for the practice. Letter will be faxed to the office with denial information.

## 2023-10-10 NOTE — TELEPHONE ENCOUNTER
LM on yesterday, 10/9/2023 for pt to rtn call to discuss denial of imaging order and what the next step would be.

## 2023-11-27 ENCOUNTER — TELEPHONE (OUTPATIENT)
Dept: FAMILY MEDICINE CLINIC | Facility: CLINIC | Age: 33
End: 2023-11-27

## 2023-12-12 NOTE — TELEPHONE ENCOUNTER
Attempted to contact patient twice - phone would ring 3-5 times and then go silent and disconnect. Unable to reach patient.

## 2024-07-09 ENCOUNTER — OFFICE VISIT (OUTPATIENT)
Dept: FAMILY MEDICINE CLINIC | Facility: CLINIC | Age: 34
End: 2024-07-09
Payer: MEDICAID

## 2024-07-09 ENCOUNTER — HOSPITAL ENCOUNTER (OUTPATIENT)
Facility: HOSPITAL | Age: 34
Setting detail: SPECIMEN
Discharge: HOME | End: 2024-07-12
Payer: COMMERCIAL

## 2024-07-09 VITALS
HEART RATE: 75 BPM | OXYGEN SATURATION: 98 % | RESPIRATION RATE: 18 BRPM | WEIGHT: 315 LBS | TEMPERATURE: 97.6 F | DIASTOLIC BLOOD PRESSURE: 88 MMHG | SYSTOLIC BLOOD PRESSURE: 140 MMHG | BODY MASS INDEX: 50.62 KG/M2 | HEIGHT: 66 IN

## 2024-07-09 DIAGNOSIS — E66.01 CLASS 3 SEVERE OBESITY DUE TO EXCESS CALORIES WITHOUT SERIOUS COMORBIDITY WITH BODY MASS INDEX (BMI) OF 60.0 TO 69.9 IN ADULT (HCC): ICD-10-CM

## 2024-07-09 DIAGNOSIS — E55.9 VITAMIN D DEFICIENCY: ICD-10-CM

## 2024-07-09 DIAGNOSIS — R03.0 ELEVATED BLOOD-PRESSURE READING, WITHOUT DIAGNOSIS OF HYPERTENSION: ICD-10-CM

## 2024-07-09 DIAGNOSIS — Z76.89 ESTABLISHING CARE WITH NEW DOCTOR, ENCOUNTER FOR: ICD-10-CM

## 2024-07-09 DIAGNOSIS — Z76.89 ESTABLISHING CARE WITH NEW DOCTOR, ENCOUNTER FOR: Primary | ICD-10-CM

## 2024-07-09 LAB
25(OH)D3 SERPL-MCNC: 45 NG/ML (ref 30–100)
ANION GAP SERPL CALC-SCNC: 3 MMOL/L (ref 3–18)
BUN SERPL-MCNC: 10 MG/DL (ref 7–18)
BUN/CREAT SERPL: 14 (ref 12–20)
CALCIUM SERPL-MCNC: 9.3 MG/DL (ref 8.5–10.1)
CHLORIDE SERPL-SCNC: 107 MMOL/L (ref 100–111)
CHOLEST SERPL-MCNC: 116 MG/DL
CO2 SERPL-SCNC: 30 MMOL/L (ref 21–32)
CREAT SERPL-MCNC: 0.71 MG/DL (ref 0.6–1.3)
ERYTHROCYTE [DISTWIDTH] IN BLOOD BY AUTOMATED COUNT: 16 % (ref 11.6–14.5)
EST. AVERAGE GLUCOSE BLD GHB EST-MCNC: 105 MG/DL
GLUCOSE SERPL-MCNC: 88 MG/DL (ref 74–99)
HBA1C MFR BLD: 5.3 % (ref 4.2–5.6)
HCT VFR BLD AUTO: 43.9 % (ref 36–48)
HDLC SERPL-MCNC: 57 MG/DL (ref 40–60)
HDLC SERPL: 2 (ref 0–5)
HGB BLD-MCNC: 13.6 G/DL (ref 13–16)
LDLC SERPL CALC-MCNC: 51 MG/DL (ref 0–100)
LIPID PANEL: NORMAL
MCH RBC QN AUTO: 26.3 PG (ref 24–34)
MCHC RBC AUTO-ENTMCNC: 31 G/DL (ref 31–37)
MCV RBC AUTO: 84.9 FL (ref 78–100)
NRBC # BLD: 0 K/UL (ref 0–0.01)
NRBC BLD-RTO: 0 PER 100 WBC
PLATELET # BLD AUTO: 315 K/UL (ref 135–420)
PMV BLD AUTO: 11.8 FL (ref 9.2–11.8)
POTASSIUM SERPL-SCNC: 4.2 MMOL/L (ref 3.5–5.5)
RBC # BLD AUTO: 5.17 M/UL (ref 4.35–5.65)
SODIUM SERPL-SCNC: 140 MMOL/L (ref 136–145)
TRIGL SERPL-MCNC: 40 MG/DL
VLDLC SERPL CALC-MCNC: 8 MG/DL
WBC # BLD AUTO: 10.6 K/UL (ref 4.6–13.2)

## 2024-07-09 PROCEDURE — 99214 OFFICE O/P EST MOD 30 MIN: CPT | Performed by: STUDENT IN AN ORGANIZED HEALTH CARE EDUCATION/TRAINING PROGRAM

## 2024-07-09 PROCEDURE — 80061 LIPID PANEL: CPT

## 2024-07-09 PROCEDURE — 83036 HEMOGLOBIN GLYCOSYLATED A1C: CPT

## 2024-07-09 PROCEDURE — 82306 VITAMIN D 25 HYDROXY: CPT

## 2024-07-09 PROCEDURE — 85027 COMPLETE CBC AUTOMATED: CPT

## 2024-07-09 PROCEDURE — 80048 BASIC METABOLIC PNL TOTAL CA: CPT

## 2024-07-09 PROCEDURE — 36415 COLL VENOUS BLD VENIPUNCTURE: CPT

## 2024-07-09 RX ORDER — IBUPROFEN 800 MG/1
800 TABLET ORAL DAILY
COMMUNITY

## 2024-07-09 RX ORDER — TRIAMCINOLONE ACETONIDE 0.25 MG/G
CREAM TOPICAL
COMMUNITY
Start: 2024-06-05

## 2024-07-09 RX ORDER — LIFITEGRAST 50 MG/ML
SOLUTION/ DROPS OPHTHALMIC
COMMUNITY
Start: 2024-07-01

## 2024-07-09 SDOH — HEALTH STABILITY: PHYSICAL HEALTH: ON AVERAGE, HOW MANY DAYS PER WEEK DO YOU ENGAGE IN MODERATE TO STRENUOUS EXERCISE (LIKE A BRISK WALK)?: 0 DAYS

## 2024-07-09 ASSESSMENT — PATIENT HEALTH QUESTIONNAIRE - PHQ9
2. FEELING DOWN, DEPRESSED OR HOPELESS: NOT AT ALL
1. LITTLE INTEREST OR PLEASURE IN DOING THINGS: NOT AT ALL
SUM OF ALL RESPONSES TO PHQ9 QUESTIONS 1 & 2: 0
SUM OF ALL RESPONSES TO PHQ QUESTIONS 1-9: 0

## 2024-07-09 ASSESSMENT — ENCOUNTER SYMPTOMS
NAUSEA: 0
COUGH: 0
DIARRHEA: 0
CHEST TIGHTNESS: 0
VOMITING: 0
SHORTNESS OF BREATH: 0

## 2024-07-09 NOTE — PROGRESS NOTES
[facility]    HISTORY OF PRESENT ILLNESS  Vinod Pandya  is a 34 y.o. y.o. male here to establish care.    Obesity  -was prescribed wegovy but not able to get it do to shortage  -following  weight loss program    Vitamin D deficiency  -took rx of vitamin D for 1 mo    Elevated bp  -elevated previously  -never had bp meds before    Health Maintenance:    Depression Screen:       7/9/2024    11:02 AM   PHQ-9    Little interest or pleasure in doing things 0   Feeling down, depressed, or hopeless 0   PHQ-2 Score 0   PHQ-9 Total Score 0        HCV Screen:   Lab Results   Component Value Date    HEPCAB .09 07/24/2023        Colon Cancer Screening: n/a  Prostate Cancer Screening:       Smoking Status:   Tobacco Use      Smoking status: Never      Smokeless tobacco: Never     Lung Cancer Screening:  CT Low Dose n/a  AAA Screening: n/a    Immunizations:  Flu vaccine- Recommended every fall  COVID vaccine primary series- complete  Tetanus- Tdap   Shingrix- series not completed  Pneumovax 23-  N/A  Prevnar 20- at age 66    Social: works at [employer]      Mr#: [id number]      Past Medical History:   Diagnosis Date    Headache     Obesity        Past Surgical History:   Procedure Laterality Date    APPENDECTOMY         Family History   Problem Relation Age of Onset    No Known Problems Mother     No Known Problems Father        No Known Allergies    Social History     Tobacco Use   Smoking Status Never   Smokeless Tobacco Never       Social History     Substance and Sexual Activity   Alcohol Use Yes    Comment: Occasional       Immunization History   Administered Date(s) Administered    TDaP, ADACEL (age 10y-64y), BOOSTRIX (age 10y+), IM, 0.5mL 05/28/2024       Patient Active Problem List   Diagnosis    Obesity, morbid (HCC)    Keratoconus, unspecified, bilateral         Current Outpatient Medications:     XIIDRA 5 % SOLN, , Disp: , Rfl:     triamcinolone (KENALOG) 0.025 % cream, , Disp: , Rfl:     ibuprofen

## 2024-07-09 NOTE — PROGRESS NOTES
Vinod Pandya is a 34 y.o. male (: 1990) presenting to address:    Chief Complaint   Patient presents with    Establish Care       Vitals:    24 1102   BP: (!) 140/88   Pulse: 75   Resp: 18   Temp: 97.6 °F (36.4 °C)   SpO2: 98%       \"Have you been to the ER, urgent care clinic since your last visit?  Hospitalized since your last visit?\"    NO    “Have you seen or consulted any other health care providers outside of [facility] since your last visit?”    NO

## 2024-10-09 ENCOUNTER — OFFICE VISIT (OUTPATIENT)
Dept: FAMILY MEDICINE CLINIC | Facility: CLINIC | Age: 34
End: 2024-10-09
Payer: COMMERCIAL

## 2024-10-09 VITALS
RESPIRATION RATE: 16 BRPM | TEMPERATURE: 97.9 F | OXYGEN SATURATION: 99 % | DIASTOLIC BLOOD PRESSURE: 69 MMHG | HEART RATE: 72 BPM | HEIGHT: 66 IN | WEIGHT: 315 LBS | BODY MASS INDEX: 50.62 KG/M2 | SYSTOLIC BLOOD PRESSURE: 102 MMHG

## 2024-10-09 DIAGNOSIS — R03.0 ELEVATED BP WITHOUT DIAGNOSIS OF HYPERTENSION: Primary | ICD-10-CM

## 2024-10-09 PROCEDURE — 99213 OFFICE O/P EST LOW 20 MIN: CPT | Performed by: STUDENT IN AN ORGANIZED HEALTH CARE EDUCATION/TRAINING PROGRAM

## 2024-10-09 SDOH — ECONOMIC STABILITY: INCOME INSECURITY: HOW HARD IS IT FOR YOU TO PAY FOR THE VERY BASICS LIKE FOOD, HOUSING, MEDICAL CARE, AND HEATING?: SOMEWHAT HARD

## 2024-10-09 SDOH — ECONOMIC STABILITY: FOOD INSECURITY: WITHIN THE PAST 12 MONTHS, YOU WORRIED THAT YOUR FOOD WOULD RUN OUT BEFORE YOU GOT MONEY TO BUY MORE.: SOMETIMES TRUE

## 2024-10-09 SDOH — ECONOMIC STABILITY: FOOD INSECURITY: WITHIN THE PAST 12 MONTHS, THE FOOD YOU BOUGHT JUST DIDN'T LAST AND YOU DIDN'T HAVE MONEY TO GET MORE.: NEVER TRUE

## 2024-10-09 ASSESSMENT — ENCOUNTER SYMPTOMS
CHEST TIGHTNESS: 0
SHORTNESS OF BREATH: 0
VOMITING: 0
COUGH: 0
NAUSEA: 0
DIARRHEA: 0

## 2024-10-09 NOTE — PROGRESS NOTES
Vinod Pandya is a 34 y.o. male (: 1990) presenting to address:    Chief Complaint   Patient presents with    Blood Pressure Check    Immunizations     Declined flu shot .        Vitals:    10/09/24 1137   BP: 102/69   Pulse: 72   Resp: 16   Temp: 97.9 °F (36.6 °C)   SpO2: 99%       \"Have you been to the ER, urgent care clinic since your last visit?  Hospitalized since your last visit?\"    NO    “Have you seen or consulted any other health care providers outside of [facility] since your last visit?”    NO

## 2024-10-09 NOTE — PROGRESS NOTES
[facility]    HISTORY OF PRESENT ILLNESS  Vinod Pandya  is a 34 y.o. y.o. male here for FU    Elevated bp  -bp today 102/69  -elevated previous visits  -never been on meds before  -no FH of HTN    Mr#: [id number]      Past Medical History:   Diagnosis Date    Headache     Obesity        Past Surgical History:   Procedure Laterality Date    APPENDECTOMY         Family History   Problem Relation Age of Onset    No Known Problems Mother     No Known Problems Father        No Known Allergies    Social History     Tobacco Use   Smoking Status Never   Smokeless Tobacco Never       Social History     Substance and Sexual Activity   Alcohol Use Yes    Comment: Occasional       Immunization History   Administered Date(s) Administered    TDaP, ADACEL (age 10y-64y), BOOSTRIX (age 10y+), IM, 0.5mL 05/28/2024       Patient Active Problem List   Diagnosis    Obesity, morbid    Keratoconus, unspecified, bilateral         Current Outpatient Medications:     XIIDRA 5 % SOLN, , Disp: , Rfl:     triamcinolone (KENALOG) 0.025 % cream, , Disp: , Rfl:     ibuprofen (ADVIL;MOTRIN) 800 MG tablet, Take 1 tablet by mouth daily as needed, Disp: , Rfl:       Review of Systems   Constitutional:  Negative for activity change and appetite change.   HENT:  Negative for congestion.    Respiratory:  Negative for cough, chest tightness and shortness of breath.    Cardiovascular:  Negative for chest pain and palpitations.   Gastrointestinal:  Negative for diarrhea, nausea and vomiting.   Neurological:  Negative for dizziness and seizures.   Psychiatric/Behavioral:  Negative for behavioral problems.        Physical Exam  Constitutional:       General: He is not in acute distress.     Appearance: Normal appearance. He is not ill-appearing.   HENT:      Head: Normocephalic and atraumatic.   Eyes:      Extraocular Movements: Extraocular movements intact.      Pupils: Pupils are equal, round, and reactive to light.   Pulmonary:

## 2025-03-04 ENCOUNTER — OFFICE VISIT (OUTPATIENT)
Age: 35
End: 2025-03-04
Payer: COMMERCIAL

## 2025-03-04 VITALS
WEIGHT: 315 LBS | DIASTOLIC BLOOD PRESSURE: 103 MMHG | OXYGEN SATURATION: 92 % | BODY MASS INDEX: 50.62 KG/M2 | RESPIRATION RATE: 18 BRPM | SYSTOLIC BLOOD PRESSURE: 149 MMHG | HEART RATE: 79 BPM | TEMPERATURE: 97.5 F | HEIGHT: 66 IN

## 2025-03-04 DIAGNOSIS — Z71.3 ENCOUNTER FOR DIETARY COUNSELING AND SURVEILLANCE: ICD-10-CM

## 2025-03-04 DIAGNOSIS — E66.01 MORBID OBESITY: Primary | ICD-10-CM

## 2025-03-04 DIAGNOSIS — Z71.3 ENCOUNTER FOR WEIGHT LOSS COUNSELING: ICD-10-CM

## 2025-03-04 PROCEDURE — 99214 OFFICE O/P EST MOD 30 MIN: CPT | Performed by: NURSE PRACTITIONER

## 2025-03-04 NOTE — PROGRESS NOTES
Medical Weight Loss Progress Note: Initial Evaluation    Vinod Pandya is a 34 y.o. male whose Body mass index is 62.62 kg/m². He is here for his Initial Evaluation for medical bariatric care.    He was previously enrolled in our medical weight loss program in 2023 under the supervision of TERA Vasquez. Despite attempts to obtain Wegovy at that time, he was unsuccessful due to national backorder supply issues. His dietary habits are inconsistent, often skipping breakfast and consuming lunch at work. His dinner schedule varies depending on his work hours, and he occasionally snacks on sandwiches, popcorn, cookies, or honey buns. He consumes sugary drinks such as Sprite or juice once daily, but primarily drinks water and milk at home. He eats out 1 to 2 times per week. His weight loss goal is to return to his high school weight of 250 to 260 pounds. He also aims to improve his energy levels and body image. He has attempted various weight loss programs and diets, including meal replacements, but found them costly and ineffective.     He works at [employer], which has increased his physical activity, although he does not engage in exercise outside of work.     Assessment & Plan     Based on his history and exam, Vinod Pandya is a good candidate for the [facility] Program.    1. Morbid obesity  2. BMI 60.0-69.9, adult  3. Encounter for weight loss counseling  4. Encounter for dietary counseling and surveillance       We agreed to work toward a weight goal of 250-260 lbs.   Diet Plan: Grocery Low Calorie Diet and Low Carbohydrate Diet  He was advised to consider meal replacements for breakfast due to his irregular eating habits. He was also advised to reduce his intake of soda and juice, and to incorporate more protein into his diet.  Activity:  He was encouraged to continue his physical activity at work and to consider exercising outside of work if his knee pain improves with weight loss.  Medication:

## 2025-03-05 NOTE — PROGRESS NOTES
[facility] Progress Note:   F/up Provider Visit    CC: Weight Management    History of Present Illness  The patient is a 34-year-old male presenting for morbid obesity to discuss options for treatment.    He was previously enrolled in a weight loss program in 2023 under the supervision of Emerita, a nurse practitioner. Despite attempts to obtain Wegovy through insurance, he was unsuccessful due to backorder issues at the pharmacy. He has not previously used any weight loss medications, including over-the-counter options. His dietary habits are inconsistent, often skipping breakfast and consuming lunch at work. His dinner schedule varies depending on his work hours, and he occasionally snacks on sandwiches, popcorn, cookies, or honey buns. He consumes sugary drinks such as Sprite or juice once daily, but primarily drinks water and milk at home. He eats out 1 to 2 times per week. He has no history of eating disorders and has never been advised against exercising. He reports no upcoming lifestyle changes or stressors and feels supported by his family, friends, and coworkers in his weight loss journey. His weight loss goal is to return to his high school weight of 250 to 260 pounds. He also aims to improve his energy levels and body image. He has no history of seizures but suspects undiagnosed anxiety. He has a history of vitamin D deficiency and slightly elevated blood pressure but has not been diagnosed with hypertension. He reports no history of abnormal heart rhythms, palpitations, or chest pain. He has attempted various weight loss programs and diets, including meal replacements, but found them costly and ineffective. He has expressed interest in meal replacements but finds them expensive. He has not taken any appetite suppressants.    He has a history of vitamin D deficiency.    Supplemental Information  He takes ibuprofen daily for knee pain and uses eyedrops.    SOCIAL HISTORY  He works at

## 2025-03-06 ASSESSMENT — ENCOUNTER SYMPTOMS
NAUSEA: 0
SHORTNESS OF BREATH: 0
VOMITING: 0
COUGH: 0
DIARRHEA: 0
ABDOMINAL PAIN: 0
CONSTIPATION: 0

## 2025-03-23 ENCOUNTER — PATIENT MESSAGE (OUTPATIENT)
Dept: FAMILY MEDICINE CLINIC | Facility: CLINIC | Age: 35
End: 2025-03-23

## 2025-03-26 RX ORDER — IBUPROFEN 800 MG/1
800 TABLET, FILM COATED ORAL DAILY PRN
Qty: 120 TABLET | Refills: 1 | Status: SHIPPED | OUTPATIENT
Start: 2025-03-26

## 2025-05-27 ENCOUNTER — OFFICE VISIT (OUTPATIENT)
Dept: FAMILY MEDICINE CLINIC | Facility: CLINIC | Age: 35
End: 2025-05-27
Payer: COMMERCIAL

## 2025-05-27 VITALS
OXYGEN SATURATION: 98 % | BODY MASS INDEX: 50.62 KG/M2 | RESPIRATION RATE: 16 BRPM | HEART RATE: 68 BPM | DIASTOLIC BLOOD PRESSURE: 85 MMHG | WEIGHT: 315 LBS | HEIGHT: 66 IN | TEMPERATURE: 97.9 F | SYSTOLIC BLOOD PRESSURE: 127 MMHG

## 2025-05-27 DIAGNOSIS — R06.83 SNORING: Primary | ICD-10-CM

## 2025-05-27 DIAGNOSIS — R51.9 CHRONIC NONINTRACTABLE HEADACHE, UNSPECIFIED HEADACHE TYPE: ICD-10-CM

## 2025-05-27 DIAGNOSIS — G89.29 CHRONIC NONINTRACTABLE HEADACHE, UNSPECIFIED HEADACHE TYPE: ICD-10-CM

## 2025-05-27 PROCEDURE — 99214 OFFICE O/P EST MOD 30 MIN: CPT | Performed by: STUDENT IN AN ORGANIZED HEALTH CARE EDUCATION/TRAINING PROGRAM

## 2025-05-27 RX ORDER — TOPIRAMATE 25 MG/1
25 CAPSULE, EXTENDED RELEASE ORAL DAILY
Qty: 90 CAPSULE | Refills: 1 | Status: SHIPPED | OUTPATIENT
Start: 2025-05-27

## 2025-05-27 SDOH — ECONOMIC STABILITY: FOOD INSECURITY: WITHIN THE PAST 12 MONTHS, THE FOOD YOU BOUGHT JUST DIDN'T LAST AND YOU DIDN'T HAVE MONEY TO GET MORE.: SOMETIMES TRUE

## 2025-05-27 SDOH — ECONOMIC STABILITY: FOOD INSECURITY: WITHIN THE PAST 12 MONTHS, YOU WORRIED THAT YOUR FOOD WOULD RUN OUT BEFORE YOU GOT MONEY TO BUY MORE.: SOMETIMES TRUE

## 2025-05-27 SDOH — ECONOMIC STABILITY: INCOME INSECURITY: IN THE LAST 12 MONTHS, WAS THERE A TIME WHEN YOU WERE NOT ABLE TO PAY THE MORTGAGE OR RENT ON TIME?: PATIENT DECLINED

## 2025-05-27 SDOH — ECONOMIC STABILITY: TRANSPORTATION INSECURITY
IN THE PAST 12 MONTHS, HAS LACK OF TRANSPORTATION KEPT YOU FROM MEETINGS, WORK, OR FROM GETTING THINGS NEEDED FOR DAILY LIVING?: PATIENT DECLINED

## 2025-05-27 SDOH — ECONOMIC STABILITY: TRANSPORTATION INSECURITY
IN THE PAST 12 MONTHS, HAS THE LACK OF TRANSPORTATION KEPT YOU FROM MEDICAL APPOINTMENTS OR FROM GETTING MEDICATIONS?: PATIENT DECLINED

## 2025-05-27 ASSESSMENT — PATIENT HEALTH QUESTIONNAIRE - PHQ9
SUM OF ALL RESPONSES TO PHQ QUESTIONS 1-9: 0
1. LITTLE INTEREST OR PLEASURE IN DOING THINGS: NOT AT ALL
2. FEELING DOWN, DEPRESSED OR HOPELESS: NOT AT ALL

## 2025-05-27 NOTE — PROGRESS NOTES
[facility]    HISTORY OF PRESENT ILLNESS  Vinod Pandya  is a 35 y.o. y.o. male here for FU.    HA x 3 years  -needs paperwork filled out for short term disability  -worse in the spring time  -occurs about once-twice a week  -directs to L side  -taking ibuprofen with some relief, also tylenol if needed  -no sensitivity to light or aura  -does snore at night        Mr#: [id number]      Past Medical History:   Diagnosis Date    Headache     Obesity        Past Surgical History:   Procedure Laterality Date    APPENDECTOMY         Family History   Problem Relation Age of Onset    No Known Problems Mother     No Known Problems Father        No Known Allergies    Social History     Tobacco Use   Smoking Status Never   Smokeless Tobacco Never       Social History     Substance and Sexual Activity   Alcohol Use Yes    Comment: Occasional       Immunization History   Administered Date(s) Administered    TDaP, ADACEL (age 10y-64y), BOOSTRIX (age 10y+), IM, 0.5mL 05/28/2024       Patient Active Problem List   Diagnosis    Obesity, morbid (HCC)    Keratoconus, unspecified, bilateral         Current Outpatient Medications:     ibuprofen (ADVIL;MOTRIN) 800 MG tablet, Take 1 tablet by mouth daily as needed for Pain, Disp: 120 tablet, Rfl: 1    XIIDRA 5 % SOLN, , Disp: , Rfl:       Review of Systems    Physical Exam     ASSESSMENT and PLAN    There are no diagnoses linked to this encounter.             Luisa Vick D.O.      PLEASE NOTE:   This document has been produced using voice recognition software. Unrecognized errors in transcription may be present.

## 2025-05-27 NOTE — PROGRESS NOTES
Vinod Pandya is a 35 y.o. male (: 1990) presenting to address:    Chief Complaint   Patient presents with    Follow-up       Vitals:    25 1418   BP: 127/85   Pulse: 68   Resp: 16   Temp: 97.9 °F (36.6 °C)   SpO2: 98%       \"Have you been to the ER, urgent care clinic since your last visit?  Hospitalized since your last visit?\"    NO    “Have you seen or consulted any other health care providers outside of [facility] since your last visit?”    YES - When: approximately 1  weeks ago.  Where and Why: Optometry for Routine Check.

## 2025-07-11 RX ORDER — IBUPROFEN 800 MG/1
800 TABLET, FILM COATED ORAL DAILY PRN
Qty: 120 TABLET | Refills: 1 | Status: SHIPPED | OUTPATIENT
Start: 2025-07-11

## 2025-07-11 RX ORDER — TOPIRAMATE 25 MG/1
25 CAPSULE, EXTENDED RELEASE ORAL DAILY
Qty: 90 CAPSULE | Refills: 1 | Status: SHIPPED | OUTPATIENT
Start: 2025-07-11

## 2025-07-11 NOTE — TELEPHONE ENCOUNTER
Requested Prescriptions     Pending Prescriptions Disp Refills    ibuprofen (ADVIL;MOTRIN) 800 MG tablet 120 tablet 1     Sig: Take 1 tablet by mouth daily as needed for Pain    topiramate ER (TROKENDI XR) 25 MG CP24 90 capsule 1     Sig: Take 25 mg by mouth daily